# Patient Record
Sex: FEMALE | Race: WHITE | Employment: UNEMPLOYED | RURAL
[De-identification: names, ages, dates, MRNs, and addresses within clinical notes are randomized per-mention and may not be internally consistent; named-entity substitution may affect disease eponyms.]

---

## 2021-10-12 PROCEDURE — 80053 COMPREHEN METABOLIC PANEL: CPT

## 2021-10-12 PROCEDURE — 85025 COMPLETE CBC W/AUTO DIFF WBC: CPT

## 2021-10-12 PROCEDURE — 85379 FIBRIN DEGRADATION QUANT: CPT

## 2021-10-12 PROCEDURE — 84484 ASSAY OF TROPONIN QUANT: CPT

## 2021-10-12 PROCEDURE — 96375 TX/PRO/DX INJ NEW DRUG ADDON: CPT

## 2021-10-12 PROCEDURE — 99285 EMERGENCY DEPT VISIT HI MDM: CPT

## 2021-10-12 PROCEDURE — 96374 THER/PROPH/DIAG INJ IV PUSH: CPT

## 2021-10-12 PROCEDURE — 36415 COLL VENOUS BLD VENIPUNCTURE: CPT

## 2021-10-12 PROCEDURE — 83690 ASSAY OF LIPASE: CPT

## 2021-10-13 ENCOUNTER — APPOINTMENT (OUTPATIENT)
Dept: GENERAL RADIOLOGY | Age: 46
End: 2021-10-13
Attending: EMERGENCY MEDICINE
Payer: MEDICAID

## 2021-10-13 ENCOUNTER — HOSPITAL ENCOUNTER (EMERGENCY)
Age: 46
Discharge: HOME | End: 2021-10-13
Attending: EMERGENCY MEDICINE | Admitting: EMERGENCY MEDICINE
Payer: MEDICAID

## 2021-10-13 VITALS
OXYGEN SATURATION: 96 % | RESPIRATION RATE: 14 BRPM | HEART RATE: 82 BPM | SYSTOLIC BLOOD PRESSURE: 121 MMHG | TEMPERATURE: 98.1 F | DIASTOLIC BLOOD PRESSURE: 59 MMHG

## 2021-10-13 DIAGNOSIS — R07.9 ACUTE CHEST PAIN: Primary | ICD-10-CM

## 2021-10-13 DIAGNOSIS — M79.2 RADICULAR PAIN IN LEFT ARM: ICD-10-CM

## 2021-10-13 LAB
ALBUMIN SERPL-MCNC: 3.9 G/DL (ref 3.5–5)
ALBUMIN/GLOB SERPL: 1.2 {RATIO} (ref 1.1–2.2)
ALP SERPL-CCNC: 92 U/L (ref 45–117)
ALT SERPL-CCNC: 24 U/L (ref 12–78)
ANION GAP SERPL CALC-SCNC: 11 MMOL/L (ref 5–15)
AST SERPL-CCNC: 25 U/L (ref 15–37)
ATRIAL RATE: 93 BPM
ATRIAL RATE: 98 BPM
BASOPHILS # BLD: 0.1 K/UL (ref 0–0.1)
BASOPHILS NFR BLD: 1 % (ref 0–1)
BILIRUB SERPL-MCNC: 0.5 MG/DL (ref 0.2–1)
BUN SERPL-MCNC: 14 MG/DL (ref 6–20)
BUN/CREAT SERPL: 15 (ref 12–20)
CALCIUM SERPL-MCNC: 8.9 MG/DL (ref 8.5–10.1)
CALCULATED P AXIS, ECG09: 56 DEGREES
CALCULATED P AXIS, ECG09: 64 DEGREES
CALCULATED R AXIS, ECG10: 14 DEGREES
CALCULATED R AXIS, ECG10: 6 DEGREES
CALCULATED T AXIS, ECG11: -12 DEGREES
CALCULATED T AXIS, ECG11: -14 DEGREES
CHLORIDE SERPL-SCNC: 102 MMOL/L (ref 97–108)
CO2 SERPL-SCNC: 27 MMOL/L (ref 21–32)
CREAT SERPL-MCNC: 0.92 MG/DL (ref 0.55–1.02)
D DIMER PPP FEU-MCNC: 0.19 MG/L FEU (ref 0–0.65)
DIAGNOSIS, 93000: NORMAL
DIAGNOSIS, 93000: NORMAL
DIFFERENTIAL METHOD BLD: ABNORMAL
EOSINOPHIL # BLD: 0.1 K/UL (ref 0–0.4)
EOSINOPHIL NFR BLD: 1 % (ref 0–7)
ERYTHROCYTE [DISTWIDTH] IN BLOOD BY AUTOMATED COUNT: 13 % (ref 11.5–14.5)
GLOBULIN SER CALC-MCNC: 3.3 G/DL (ref 2–4)
GLUCOSE SERPL-MCNC: 121 MG/DL (ref 65–100)
HCT VFR BLD AUTO: 42.5 % (ref 35–47)
HGB BLD-MCNC: 14.6 G/DL (ref 11.5–16)
IMM GRANULOCYTES # BLD AUTO: 0 K/UL (ref 0–0.04)
IMM GRANULOCYTES NFR BLD AUTO: 0 % (ref 0–0.5)
LIPASE SERPL-CCNC: 50 U/L (ref 73–393)
LYMPHOCYTES # BLD: 2.7 K/UL (ref 0.8–3.5)
LYMPHOCYTES NFR BLD: 21 % (ref 12–49)
MCH RBC QN AUTO: 31.7 PG (ref 26–34)
MCHC RBC AUTO-ENTMCNC: 34.4 G/DL (ref 30–36.5)
MCV RBC AUTO: 92.2 FL (ref 80–99)
MONOCYTES # BLD: 0.9 K/UL (ref 0–1)
MONOCYTES NFR BLD: 7 % (ref 5–13)
NEUTS SEG # BLD: 9.1 K/UL (ref 1.8–8)
NEUTS SEG NFR BLD: 70 % (ref 32–75)
NRBC # BLD: 0 K/UL (ref 0–0.01)
NRBC BLD-RTO: 0 PER 100 WBC
P-R INTERVAL, ECG05: 118 MS
P-R INTERVAL, ECG05: 120 MS
PLATELET # BLD AUTO: 330 K/UL (ref 150–400)
PMV BLD AUTO: 9.9 FL (ref 8.9–12.9)
POTASSIUM SERPL-SCNC: 4.2 MMOL/L (ref 3.5–5.1)
PROT SERPL-MCNC: 7.2 G/DL (ref 6.4–8.2)
Q-T INTERVAL, ECG07: 362 MS
Q-T INTERVAL, ECG07: 374 MS
QRS DURATION, ECG06: 80 MS
QRS DURATION, ECG06: 94 MS
QTC CALCULATION (BEZET), ECG08: 462 MS
QTC CALCULATION (BEZET), ECG08: 465 MS
RBC # BLD AUTO: 4.61 M/UL (ref 3.8–5.2)
SODIUM SERPL-SCNC: 140 MMOL/L (ref 136–145)
TROPONIN-HIGH SENSITIVITY: 6 NG/L (ref 0–51)
TROPONIN-HIGH SENSITIVITY: 6 NG/L (ref 0–51)
VENTRICULAR RATE, ECG03: 93 BPM
VENTRICULAR RATE, ECG03: 98 BPM
WBC # BLD AUTO: 12.8 K/UL (ref 3.6–11)

## 2021-10-13 PROCEDURE — 74011000250 HC RX REV CODE- 250: Performed by: EMERGENCY MEDICINE

## 2021-10-13 PROCEDURE — 93005 ELECTROCARDIOGRAM TRACING: CPT

## 2021-10-13 PROCEDURE — 74011250637 HC RX REV CODE- 250/637: Performed by: EMERGENCY MEDICINE

## 2021-10-13 PROCEDURE — 71045 X-RAY EXAM CHEST 1 VIEW: CPT

## 2021-10-13 PROCEDURE — 74011250636 HC RX REV CODE- 250/636: Performed by: EMERGENCY MEDICINE

## 2021-10-13 RX ORDER — ONDANSETRON 2 MG/ML
4 INJECTION INTRAMUSCULAR; INTRAVENOUS ONCE
Status: COMPLETED | OUTPATIENT
Start: 2021-10-13 | End: 2021-10-13

## 2021-10-13 RX ORDER — KETOROLAC TROMETHAMINE 30 MG/ML
15 INJECTION, SOLUTION INTRAMUSCULAR; INTRAVENOUS
Status: COMPLETED | OUTPATIENT
Start: 2021-10-13 | End: 2021-10-13

## 2021-10-13 RX ORDER — MORPHINE SULFATE 2 MG/ML
2 INJECTION, SOLUTION INTRAMUSCULAR; INTRAVENOUS
Status: COMPLETED | OUTPATIENT
Start: 2021-10-13 | End: 2021-10-13

## 2021-10-13 RX ORDER — SODIUM CHLORIDE 0.9 % (FLUSH) 0.9 %
5-10 SYRINGE (ML) INJECTION ONCE
Status: DISCONTINUED | OUTPATIENT
Start: 2021-10-13 | End: 2021-10-13 | Stop reason: HOSPADM

## 2021-10-13 RX ORDER — NITROGLYCERIN 0.4 MG/1
0.4 TABLET SUBLINGUAL
Qty: 1 EACH | Refills: 0 | Status: SHIPPED | OUTPATIENT
Start: 2021-10-13 | End: 2022-10-08

## 2021-10-13 RX ADMIN — ONDANSETRON 4 MG: 2 INJECTION INTRAMUSCULAR; INTRAVENOUS at 00:26

## 2021-10-13 RX ADMIN — KETOROLAC TROMETHAMINE 15 MG: 30 INJECTION, SOLUTION INTRAMUSCULAR at 00:31

## 2021-10-13 RX ADMIN — MORPHINE SULFATE 2 MG: 2 INJECTION, SOLUTION INTRAMUSCULAR; INTRAVENOUS at 01:29

## 2021-10-13 RX ADMIN — LIDOCAINE HYDROCHLORIDE 40 ML: 20 SOLUTION ORAL; TOPICAL at 01:25

## 2021-10-13 RX ADMIN — NITROGLYCERIN 0.5 INCH: 20 OINTMENT TOPICAL at 01:27

## 2021-10-13 NOTE — ED PROVIDER NOTES
EMERGENCY DEPARTMENT HISTORY AND PHYSICAL EXAM      Date: 10/13/2021  Patient Name: Damaris Ham    History of Presenting Illness     Chief Complaint   Patient presents with    Chest Pain       History Provided By: Patient    HPI:   The history is provided by the patient. Chest Pain (Angina)   This is a new problem. The current episode started 3 to 5 hours ago. The problem has not changed since onset. The problem occurs constantly. The pain is associated with lifting, movement, lifting arms, normal activity and stress. The pain is present in the left side. The quality of the pain is described as pleuritic, tightness and sharp. The pain radiates to the left arm. Associated symptoms include cough, nausea, shortness of breath, vomiting and weakness. Pertinent negatives include no abdominal pain, no back pain, no diaphoresis, no dizziness, no exertional chest pressure, no fever, no headaches, no hemoptysis, no leg pain, no lower extremity edema, no malaise/fatigue, no near-syncope, no numbness, no orthopnea, no palpitations and no sputum production. She has tried nothing for the symptoms. The treatment provided no relief. Risk factors include hypertension. Her past medical history is significant for HTN. Her past medical history does not include DM. Damaris Ham, 48 y.o. female  presents to the ED with cc of left shoulder left neck left chest pain that started approximately 9 PM tonight. Patient reports history of coronary disease, she reports she has 5 stents. She has a history of high blood pressure high cholesterol. She reports she was sitting watching TV when this started. She reports she had pain in the side the neck that radiated down the left arm. RePorts her blood pressure was elevated she took extra doses of her lisinopril. She does report cough and shortness of breath. She reports she had an onset of generalized weakness prior to the symptoms starting.   She denies any exertional chest pain shortness of breath over the last several days but she has had intermittent chest pain at rest.    She denies any history of diabetes she is a former smoker. She took no medications. She received morphine aspirin nitroglycerin Zofran by EMS. She denies any recent trauma injuries or falls. She denies any abdominal pain diarrhea fevers chills upper respiratory symptoms. She denies any exposures to sick contacts COVID-19, she is not vaccinated. There are no other complaints, changes, or physical findings at this time. PCP: None    No current facility-administered medications on file prior to encounter. No current outpatient medications on file prior to encounter. Past History     Past Medical History:  No past medical history on file. Past Surgical History:  No past surgical history on file. Family History:  No family history on file. Social History:  Social History     Tobacco Use    Smoking status: Not on file   Substance Use Topics    Alcohol use: Not on file    Drug use: Not on file       Allergies:  Not on File      Review of Systems   Review of Systems   Constitutional: Negative. Negative for chills, diaphoresis, fever and malaise/fatigue. HENT: Negative. Negative for congestion and sore throat. Eyes: Negative. Negative for discharge and redness. Respiratory: Positive for cough and shortness of breath. Negative for hemoptysis and sputum production. Cardiovascular: Positive for chest pain. Negative for palpitations, orthopnea and near-syncope. Gastrointestinal: Positive for nausea and vomiting. Negative for abdominal pain and diarrhea. Endocrine: Negative. Negative for polydipsia and polyuria. Genitourinary: Negative. Negative for dysuria, flank pain and frequency. Musculoskeletal: Negative. Negative for arthralgias and back pain. Skin: Negative. Negative for rash and wound. Allergic/Immunologic: Negative. Neurological: Positive for weakness. Negative for dizziness, numbness and headaches. Hematological: Negative. Negative for adenopathy. Does not bruise/bleed easily. Psychiatric/Behavioral: Negative. Negative for confusion. The patient is not nervous/anxious. All other systems reviewed and are negative. Physical Exam   Physical Exam  Vitals and nursing note reviewed. Constitutional:       General: She is in acute distress. Appearance: Normal appearance. She is well-developed. She is not ill-appearing, toxic-appearing or diaphoretic. Comments: Patient lying in bed appears uncomfortable. HENT:      Head: Normocephalic and atraumatic. Nose: Nose normal.      Mouth/Throat:      Mouth: Mucous membranes are moist.      Pharynx: Oropharynx is clear. Eyes:      Extraocular Movements: Extraocular movements intact. Conjunctiva/sclera: Conjunctivae normal.      Pupils: Pupils are equal, round, and reactive to light. Neck:     Cardiovascular:      Rate and Rhythm: Normal rate and regular rhythm. Pulses: Normal pulses. Heart sounds: Normal heart sounds. Pulmonary:      Effort: Pulmonary effort is normal. No respiratory distress. Breath sounds: Normal breath sounds. Chest:      Chest wall: Tenderness present. Abdominal:      General: There is no distension. Palpations: Abdomen is soft. There is no mass. Tenderness: There is no abdominal tenderness. There is no guarding or rebound. Hernia: No hernia is present. Musculoskeletal:         General: No swelling. Normal range of motion. Left shoulder: Tenderness present. No swelling or bony tenderness. Normal strength. Normal pulse. Arms:       Cervical back: Normal range of motion and neck supple. Tenderness present. No rigidity. Muscular tenderness present. Right lower leg: No edema. Skin:     General: Skin is warm and dry. Capillary Refill: Capillary refill takes less than 2 seconds. Findings: No erythema or rash. Neurological:      General: No focal deficit present. Mental Status: She is alert and oriented to person, place, and time. Mental status is at baseline. Cranial Nerves: No cranial nerve deficit. Sensory: No sensory deficit. Motor: No weakness. Psychiatric:         Mood and Affect: Mood normal.         Behavior: Behavior normal.         Thought Content: Thought content normal.         Judgment: Judgment normal.         Diagnostic Study Results     Labs -     Recent Results (from the past 12 hour(s))   CBC WITH AUTOMATED DIFF    Collection Time: 10/12/21 11:58 PM   Result Value Ref Range    WBC 12.8 (H) 3.6 - 11.0 K/uL    RBC 4.61 3.80 - 5.20 M/uL    HGB 14.6 11.5 - 16.0 g/dL    HCT 42.5 35.0 - 47.0 %    MCV 92.2 80.0 - 99.0 FL    MCH 31.7 26.0 - 34.0 PG    MCHC 34.4 30.0 - 36.5 g/dL    RDW 13.0 11.5 - 14.5 %    PLATELET 952 140 - 731 K/uL    MPV 9.9 8.9 - 12.9 FL    NRBC 0.0 0  WBC    ABSOLUTE NRBC 0.00 0.00 - 0.01 K/uL    NEUTROPHILS 70 32 - 75 %    LYMPHOCYTES 21 12 - 49 %    MONOCYTES 7 5 - 13 %    EOSINOPHILS 1 0 - 7 %    BASOPHILS 1 0 - 1 %    IMMATURE GRANULOCYTES 0 0.0 - 0.5 %    ABS. NEUTROPHILS 9.1 (H) 1.8 - 8.0 K/UL    ABS. LYMPHOCYTES 2.7 0.8 - 3.5 K/UL    ABS. MONOCYTES 0.9 0.0 - 1.0 K/UL    ABS. EOSINOPHILS 0.1 0.0 - 0.4 K/UL    ABS. BASOPHILS 0.1 0.0 - 0.1 K/UL    ABS. IMM.  GRANS. 0.0 0.00 - 0.04 K/UL    DF AUTOMATED     METABOLIC PANEL, COMPREHENSIVE    Collection Time: 10/12/21 11:58 PM   Result Value Ref Range    Sodium 140 136 - 145 mmol/L    Potassium 4.2 3.5 - 5.1 mmol/L    Chloride 102 97 - 108 mmol/L    CO2 27 21 - 32 mmol/L    Anion gap 11 5 - 15 mmol/L    Glucose 121 (H) 65 - 100 mg/dL    BUN 14 6 - 20 MG/DL    Creatinine 0.92 0.55 - 1.02 MG/DL    BUN/Creatinine ratio 15 12 - 20      GFR est AA >60 >60 ml/min/1.73m2    GFR est non-AA >60 >60 ml/min/1.73m2    Calcium 8.9 8.5 - 10.1 MG/DL    Bilirubin, total 0.5 0.2 - 1.0 MG/DL    ALT (SGPT) 24 12 - 78 U/L    AST (SGOT) 25 15 - 37 U/L    Alk. phosphatase 92 45 - 117 U/L    Protein, total 7.2 6.4 - 8.2 g/dL    Albumin 3.9 3.5 - 5.0 g/dL    Globulin 3.3 2.0 - 4.0 g/dL    A-G Ratio 1.2 1.1 - 2.2     LIPASE    Collection Time: 10/12/21 11:58 PM   Result Value Ref Range    Lipase 50 (L) 73 - 393 U/L   D DIMER    Collection Time: 10/12/21 11:58 PM   Result Value Ref Range    D-dimer 0.19 0.00 - 0.65 mg/L FEU   TROPONIN-HIGH SENSITIVITY    Collection Time: 10/12/21 11:58 PM   Result Value Ref Range    Troponin-High Sensitivity 6 0 - 51 ng/L   EKG, 12 LEAD, INITIAL    Collection Time: 10/13/21 12:16 AM   Result Value Ref Range    Ventricular Rate 98 BPM    Atrial Rate 98 BPM    P-R Interval 120 ms    QRS Duration 80 ms    Q-T Interval 362 ms    QTC Calculation (Bezet) 462 ms    Calculated P Axis 64 degrees    Calculated R Axis 14 degrees    Calculated T Axis -12 degrees    Diagnosis       Normal sinus rhythm  Nonspecific ST and T wave abnormality  Abnormal ECG  No previous ECGs available     EKG, 12 LEAD, SUBSEQUENT    Collection Time: 10/13/21  2:18 AM   Result Value Ref Range    Ventricular Rate 93 BPM    Atrial Rate 93 BPM    P-R Interval 118 ms    QRS Duration 94 ms    Q-T Interval 374 ms    QTC Calculation (Bezet) 465 ms    Calculated P Axis 56 degrees    Calculated R Axis 6 degrees    Calculated T Axis -14 degrees    Diagnosis       Normal sinus rhythm  Minimal voltage criteria for LVH, may be normal variant  Nonspecific ST abnormality  Abnormal ECG  When compared with ECG of 13-OCT-2021 00:16,  MANUAL COMPARISON REQUIRED, DATA IS UNCONFIRMED     TROPONIN-HIGH SENSITIVITY    Collection Time: 10/13/21  2:25 AM   Result Value Ref Range    Troponin-High Sensitivity 6 0 - 51 ng/L       Radiologic Studies -   XR CHEST PORT   Final Result   No acute process identified.                CT Results  (Last 48 hours)    None        CXR Results  (Last 48 hours)               10/13/21 0038  XR CHEST PORT Final result    Impression:  No acute process identified. Narrative:  Clinical history: left CP   INDICATION:   left CP   COMPARISON: None       FINDINGS:   AP portable upright view of the chest demonstrates a normal cardiopericardial   silhouette. There is no pleural effusion. .There is no focal consolidation. .There   is no pneumothorax. . Patient is on a cardiac monitor. Medical Decision Making   I am the first provider for this patient. I reviewed the vital signs, available nursing notes, past medical history, past surgical history, family history and social history. Vital Signs-Reviewed the patient's vital signs. Patient Vitals for the past 12 hrs:   Temp Pulse Resp BP SpO2   10/13/21 0300  82 14 (!) 121/59    10/13/21 0245   17 101/65    10/13/21 0230  90 21 108/63    10/13/21 0215  99 16 122/84    10/13/21 0200  91 22 117/77    10/13/21 0145  92 23 127/79    10/13/21 0130  90 16 (!) 147/82    10/13/21 0115  86 13 134/87 96 %   10/13/21 0100   13 (!) 153/94    10/13/21 0045  91 12     10/13/21 0030  (!) 101 17 (!) 121/91    10/13/21 0007 98.1 °F (36.7 °C) (!) 106 20  99 %           EKG interpretation: (Preliminary)  Rhythm: normal sinus rhythm;  regular . Rate (approx.): 98; Blocks: none; Ectopy: noneAxis: normal; P wave: normal; QRS interval: normal ; ST/T wave: non-specific changes; in  Lead: ; Other findings: . EKG interpretation: (Preliminary)  Rhythm: normal sinus rhythm; and regular . Rate (approx.): 93; Blocks: none; Ectopy: none; Axis: normal; P wave: normal; QRS interval: normal ; ST/T wave: non-specific changes; in  Lead: ; Other findings: No change compared with prior. This EKG was interpreted by Nora Crockett MD ,ED Provider.       Records Reviewed: Nursing Notes and Old Medical Records    Provider Notes (Medical Decision Making):   Patient presents with chest pain reports history of coronary disease, will check laboratory studies medicate for symptoms and reassess. ED Course:   Initial assessment performed. The patients presenting problems have been discussed, and they are in agreement with the care plan formulated and outlined with them. I have encouraged them to ask questions as they arise throughout their visit. ED Course as of Oct 13 0306   Wed Oct 13, 2021   0118 Patient reports her chest pain has almost completely resolved, reviewed lab results with patient and plan for transfer admission. Patient wants to be discharged, will agree to do a second troponin. [MF]   4455 Reviewed repeat troponin with patient, recommend admission to the hospital for cardiology evaluation. Patient does not want to be admitted she reports her chest pain is almost completely resolved. She wants to be discharged to contact her cardiologist later today and to be set up for a stress test as an outpatient. She will return if there is any change or worsening of her symptoms. Risk and benefits explained to patient including death recurrent chest pain heart attack and increased morbidity mortality. Patient verbalizes understanding with her significant other and wants to be discharged. She will continue her aspirin at home. [MF]      ED Course User Index  [MF] Darryle Blanks, MD         Medications Given in the ED:    Medications   sodium chloride (NS) flush 5-10 mL (has no administration in time range)   [facility] injection 4 mg (4 mg IntraVENous Given 10/13/21 0026)   ketorolac (TORADOL) injection 15 mg (15 mg IntraVENous Given 10/13/21 0031)   nitroglycerin (NITROBID) 2 % ointment 0.5 Inch (0.5 Inches Topical Given 10/13/21 0127)   morphine injection 2 mg (2 mg IntraVENous Given 10/13/21 0129)   mylanta/viscous lidocaine (GI COCKTAIL) (40 mL Oral Given 10/13/21 0125)           3:06 AM    Patient presents complaining of chest pain his history of coronary disease with 5 stents.   EKG revealed nonspecific changes, troponin x2 is negative, white blood cell count was elevated. Patient has no fever no signs of sepsis, she declined COVID-19 test.  Chest x-ray was clear. Patient improved with medications here. Patient was recommended to be transferred admitted for cardiology evaluation patient declined risk and benefits explained, she wants to follow-up with her cardiologist as an outpatient later today she is instructed to return for change or worsening of symptoms. Patient does have elevated heart score recommend admission however patient again wants to do outpatient management and will return for any change in symptoms. Pt has been re-examined and states that they are feeling better and have no new complaints. Laboratory tests, medications, x-rays, diagnosis, follow up plan and return instructions have been reviewed and discussed with the patient and/or family. Pt and/or family were instructed on symptoms that may arise after discharge requiring re-evaluation by a physician. Pt and/or family have had the opportunity to ask questions about their care. Patient and/or family verbalized understanding and agreement with care plan, follow up and return instructions. Patient and/or family agree to return in 50 hours if their symptoms are not improving or immediately if they have any change in their condition. I have also put together some discharge instructions for patient that include: 1) educational information regarding their diagnosis, 2) how to care for their diagnosis at home, as well a 3) list of reasons why they would want to return to the ED prior to their follow-up appointment, should their condition change. Jose Cohen MD      Procedures        Disposition:    Discharged      DISCHARGE PLAN:  1. There are no discharge medications for this patient.     2.   Follow-up Information     Follow up With Specialties Details Why Contact Info    Carson Msos MD Cardiology Call today For follow up [place] [address]  [phone]          9. Return to ED if worse     Diagnosis     Clinical Impression:   1. Acute chest pain    2. Radicular pain in left arm        Attestations: Karlee Kirkpatrick MD    Please note that this dictation was completed with [employer], the computer voice recognition software. Quite often unanticipated grammatical, syntax, homophones, and other interpretive errors are inadvertently transcribed by the computer software. Please disregard these errors. Please excuse any errors that have escaped final proofreading. Thank you.

## 2022-02-15 ENCOUNTER — TRANSCRIBE ORDER (OUTPATIENT)
Dept: SCHEDULING | Age: 47
End: 2022-02-15

## 2022-02-15 DIAGNOSIS — Z12.31 OTHER SCREENING MAMMOGRAM: Primary | ICD-10-CM

## 2022-04-04 ENCOUNTER — APPOINTMENT (OUTPATIENT)
Dept: CT IMAGING | Age: 47
End: 2022-04-04
Attending: FAMILY MEDICINE
Payer: COMMERCIAL

## 2022-04-04 ENCOUNTER — HOSPITAL ENCOUNTER (EMERGENCY)
Age: 47
Discharge: HOME | End: 2022-04-04
Attending: FAMILY MEDICINE
Payer: COMMERCIAL

## 2022-04-04 VITALS
RESPIRATION RATE: 22 BRPM | WEIGHT: 182 LBS | HEART RATE: 123 BPM | DIASTOLIC BLOOD PRESSURE: 123 MMHG | OXYGEN SATURATION: 95 % | SYSTOLIC BLOOD PRESSURE: 187 MMHG

## 2022-04-04 DIAGNOSIS — S01.511A LIP LACERATION, INITIAL ENCOUNTER: Primary | ICD-10-CM

## 2022-04-04 PROCEDURE — 77030018842 HC SOL IRR SOD CL 9% BAXT -A

## 2022-04-04 PROCEDURE — 77030002916 HC SUT ETHLN J&J -A

## 2022-04-04 PROCEDURE — 77030018831 HC SOL IRR H20 BAXT -A

## 2022-04-04 PROCEDURE — 70450 CT HEAD/BRAIN W/O DYE: CPT

## 2022-04-04 PROCEDURE — 74011250637 HC RX REV CODE- 250/637: Performed by: FAMILY MEDICINE

## 2022-04-04 PROCEDURE — 77030031139 HC SUT VCRL2 J&J -A

## 2022-04-04 PROCEDURE — 72125 CT NECK SPINE W/O DYE: CPT

## 2022-04-04 PROCEDURE — 75810000294 HC INTERM/LAYERED WND RPR

## 2022-04-04 PROCEDURE — 99284 EMERGENCY DEPT VISIT MOD MDM: CPT

## 2022-04-04 PROCEDURE — 77030002888 HC SUT CHRMC J&J -A

## 2022-04-04 RX ORDER — HYDROCODONE BITARTRATE AND ACETAMINOPHEN 5; 325 MG/1; MG/1
1 TABLET ORAL
Qty: 12 TABLET | Refills: 0 | Status: SHIPPED | OUTPATIENT
Start: 2022-04-04 | End: 2022-04-07

## 2022-04-04 RX ORDER — AMOXICILLIN AND CLAVULANATE POTASSIUM 875; 125 MG/1; MG/1
1 TABLET, FILM COATED ORAL 2 TIMES DAILY
Qty: 14 TABLET | Refills: 0 | Status: SHIPPED | OUTPATIENT
Start: 2022-04-04 | End: 2022-09-21

## 2022-04-04 RX ORDER — LIDOCAINE HYDROCHLORIDE AND EPINEPHRINE 10; 10 MG/ML; UG/ML
1.5 INJECTION, SOLUTION INFILTRATION; PERINEURAL ONCE
Status: DISCONTINUED | OUTPATIENT
Start: 2022-04-04 | End: 2022-04-04 | Stop reason: HOSPADM

## 2022-04-04 RX ORDER — HYDROCODONE BITARTRATE AND ACETAMINOPHEN 5; 325 MG/1; MG/1
1 TABLET ORAL
Status: COMPLETED | OUTPATIENT
Start: 2022-04-04 | End: 2022-04-04

## 2022-04-04 RX ORDER — AMOXICILLIN AND CLAVULANATE POTASSIUM 875; 125 MG/1; MG/1
1 TABLET, FILM COATED ORAL
Status: COMPLETED | OUTPATIENT
Start: 2022-04-04 | End: 2022-04-04

## 2022-04-04 RX ORDER — ONDANSETRON 4 MG/1
4 TABLET, ORALLY DISINTEGRATING ORAL
Status: DISCONTINUED | OUTPATIENT
Start: 2022-04-04 | End: 2022-04-04 | Stop reason: HOSPADM

## 2022-04-04 RX ADMIN — AMOXICILLIN AND CLAVULANATE POTASSIUM 1 TABLET: 875; 125 TABLET, FILM COATED ORAL at 03:50

## 2022-04-04 RX ADMIN — HYDROCODONE BITARTRATE AND ACETAMINOPHEN 1 TABLET: 5; 325 TABLET ORAL at 03:50

## 2022-04-04 NOTE — DISCHARGE INSTRUCTIONS
--Augmentin 875 mg twice daily for the next 7 days. --Ibuprofen 600 mg every 6 hours as needed for pain. Take with food or milk. --Norco 1 tab every 6 hours as needed for severe pain. Will cause drowsiness and nausea. --Follow up with Dr. Terri Maldonado or Anjel Harris this week for a recheck.

## 2022-04-04 NOTE — ED TRIAGE NOTES
Pt experienced mechanical fall at home pta. Tripped in gravel driveway and landed on her face. Tear to left upper lip, bleeding controlled at this time. Pt reports no LOC.  States utd on t-dap

## 2022-04-04 NOTE — ED PROVIDER NOTES
EMERGENCY DEPARTMENT HISTORY AND PHYSICAL EXAM          Date: 4/4/2022  Patient Name: Sriram Liao    History of Presenting Illness     Chief Complaint   Patient presents with    Fall       History Provided By: Patient and Patient's     HPI: Sriram Liao is a 48 y.o. female, pmhx CAD, s/p 5 stents, Htn, was brought to the ED by her boyfriend because of a facial injury that occurred when she fell about an hour ago. She and her partner had taken a large amount of [place] earlier in the evening and they had gone to bed. She got up to get a cigarette, which she had put in her truck. As she was walking to the truck, she tripped and fell on her face. She denies that she had any LOC, and she has no pain except in her upper lip, where she has a significant laceration. She reports that she has a laceration in the underside of her tongue. No injuries to her hands or arms. PCP: None    Allergies: NKDA  Social Hx: Occas tobacco, -vaping, -EtOH; Lives in [facility] partner    There are no other complaints, changes, or physical findings at this time. Current Outpatient Medications   Medication Sig Dispense Refill    amoxicillin-clavulanate (Augmentin) 875-125 mg per tablet Take 1 Tablet by mouth two (2) times a day. 14 Tablet 0    HYDROcodone-acetaminophen (Norco) 5-325 mg per tablet Take 1 Tablet by mouth every six (6) hours as needed for Pain for up to 3 days. Max Daily Amount: 4 Tablets. 12 Tablet 0    nitroglycerin (NITROSTAT) 0.4 mg SL tablet Take 1 Tablet by mouth every five (5) minutes as needed for Chest Pain (x3) for up to 360 days. 1 Each 0       Past History     Past Medical History:  History reviewed. No pertinent past medical history. Past Surgical History:  No past surgical history on file. Family History:  History reviewed. No pertinent family history.     Social History:  Social History     Tobacco Use    Smoking status: Not on file    Smokeless tobacco: Not on file Substance Use Topics    Alcohol use: Not on file    Drug use: Not on file       Allergies:  No Known Allergies      Review of Systems   Review of Systems   HENT: Positive for dental problem and facial swelling. Eyes: Negative for photophobia and visual disturbance. Cardiovascular: Negative for chest pain. Gastrointestinal: Negative for nausea. Musculoskeletal: Positive for neck pain. Skin: Positive for wound. Neurological: Positive for light-headedness. Negative for headaches. Physical Exam   Physical Exam  Constitutional:       General: She is in acute distress. Comments: Appears somewhat intoxicated but appropriate and apologetic. HENT:      Head: Normocephalic. Abrasion present. Jaw: There is normal jaw occlusion. No tenderness. Comments: --Abrasion over anterior aspect of nose. Nose: Signs of injury and nasal tenderness present. No nasal deformity. Right Nostril: No epistaxis. Left Nostril: No epistaxis. Comments: Abrasion, swelling and tenderness along anterior aspect, but no evidence of epistaxis or deformity. Abrasion fairly superficial.     Mouth/Throat:      Mouth: Mucous membranes are moist. Injury and lacerations present. Comments: --Significant laceration to upper lip, with a large gap in the mid portion, between two flaps of lip, just to vermilion border. Flaps are retracted, with extensive gravel dust embedded. --Teeth in tact. Possibly superficial chipping on underside of anterior incisors. Eyes:      Extraocular Movements: Extraocular movements intact. Pupils: Pupils are equal, round, and reactive to light. Cardiovascular:      Rate and Rhythm: Normal rate. Pulmonary:      Effort: Pulmonary effort is normal.   Musculoskeletal:         General: No swelling or tenderness. Normal range of motion. Skin:     General: Skin is warm and dry. Neurological:      Mental Status: She is alert.          Diagnostic Study Results Labs -   No results found for this or any previous visit (from the past 12 hour(s)). Radiologic Studies -   CT HEAD WO CONT   Final Result   1. No evidence of acute intracranial abnormality by this modality. CT SPINE CERV WO CONT   Final Result   1. No visualized fracture or subluxation. CT Results  (Last 48 hours)               04/04/22 0328  CT HEAD WO CONT Final result    Impression:  1. No evidence of acute intracranial abnormality by this modality. Narrative:  EXAM:  CT HEAD WO CONT   INDICATION:   fall c alcohol. lip lac. Additional history:   COMPARISON: None. .   TECHNIQUE:    Unenhanced CT of the head was performed using 5 mm images. Coronal and sagittal   reformats were produced. Brain and bone windows were generated. CT dose reduction was achieved through use of a standardized protocol tailored   for this examination and automatic exposure control for dose modulation. Lilly Stoner FINDINGS:   The ventricles and sulci are normal in size, shape and configuration and   midline. There is no significant white matter disease. There is no intracranial   hemorrhage, extra-axial collection, mass, mass effect or midline shift. The   basilar cisterns are open. No acute infarct is identified. The bone windows demonstrate no abnormalities. The visualized portions of the   paranasal sinuses and mastoid air cells are clear. Lilly Stoner 04/04/22 0328  CT SPINE CERV WO CONT Final result    Impression:  1. No visualized fracture or subluxation. Narrative:  INDICATION:  None provided. Additional history: Fall   COMPARISON: None. .   TECHNIQUE:      Noncontrast axial CT imaging of the cervical spine was performed. Coronal and   sagittal reconstructions were obtained. CT dose reduction was achieved through use of a standardized protocol tailored   for this examination and automatic exposure control for dose modulation. Lilly Snider    FINDINGS:   Disc osteophyte complex formation from C5-C7. Reversal of the normal cervical   lordosis. There is no visualized fracture or subluxation. Incidental imaging of the paraspinal soft tissues is unremarkable. The   visualized lung apices are unremarkable. .           CXR Results  (Last 48 hours)    None            Medical Decision Making   I am the first provider for this patient. I reviewed the vital signs, available nursing notes, past medical history, past surgical history, family history and social history. Vital Signs-Reviewed the patient's vital signs. No data found. Pulse Oximetry Analysis - 95% on RA    Records Reviewed: Nursing Notes    Provider Notes (Medical Decision Making):   MDM: Pt with significant facial injury, so hypertension understandable. After CT scans, hydrocodone, ondansetron, BP still elevated. Will have her follow up with general surgery and speak with surgeon, who may want to send her to Plastic surgery for follow up. ED Course:   Initial assessment performed. The patients presenting problems have been discussed, and they are in agreement with the care plan formulated and outlined with them. I have encouraged them to ask questions as they arise throughout their visit. PROGRESS NOTE:  Wound Repair    Date/Time: 4/4/2022 2:17 AM  Performed by: attendingTime out: Immediately prior to the procedure a time out was called to verify the correct patient, procedure, equipment, staff and marking as appropriate. .  Location details: face  Wound length:2.6 - 7.5 cm  Anesthesia: local infiltration    Anesthesia:  Local Anesthetic: lidocaine 1% with epinephrine and lidocaine 1% without epinephrine  Anesthetic total: 5 mL  Sedatives: morphine  Foreign body present: gravel. Irrigation solution: saline  Irrigation method: 500 ml NS from bottle.   Debridement: none  Skin closure: 6-0 nylon  Subcutaneous closure: gut (Tongue, inferior aspect: 2 cm. 2 5-0 chromic interrupted sutures.)  Number of sutures: 18  Technique: simple  Approximation: loose  Lip approximation: vermillion border well aligned  Patient tolerance: patient tolerated the procedure well with no immediate complications  My total time at bedside, performing this procedure was 46-60 minutes. Discharge note:  Pt re-evaluated and noted to be feeling better, ready for discharge. Updated pt  on all final CT findings. Will follow up as instructed. All questions have been answered, pt voiced understanding and agreement with plan. Narcotics were prescribed, pt was advised not to drive or operate heavy machinery. Abx were prescribed, pt advised that diarrhea and rash are possible side effects of the medications. Specific return precautions provided as well as instructions to return to the ED should sx worsen at any time. Vital signs stable for discharge. Critical Care Time:   0      Diagnosis     Clinical Impression:   1. Lip laceration, initial encounter        PLAN:  1. Discharge Medication List as of 4/4/2022  3:49 AM      START taking these medications    Details   amoxicillin-clavulanate (Augmentin) 875-125 mg per tablet Take 1 Tablet by mouth two (2) times a day., Normal, Disp-14 Tablet, R-0      HYDROcodone-acetaminophen (Norco) 5-325 mg per tablet Take 1 Tablet by mouth every six (6) hours as needed for Pain for up to 3 days. Max Daily Amount: 4 Tablets., Normal, Disp-12 Tablet, R-0         CONTINUE these medications which have NOT CHANGED    Details   nitroglycerin (NITROSTAT) 0.4 mg SL tablet Take 1 Tablet by mouth every five (5) minutes as needed for Chest Pain (x3) for up to 360 days. , Print, Disp-1 Each, R-0           2.    Follow-up Information     Follow up With Specialties Details Why Contact Info    Shawnee Scheuermann, MD General Surgery In 2 days For wound re-check [facility] [address] [place] [address]  [phone]          Return to ED if worse     Disposition:  Home       Please note, this dictation was completed with Dragon, the computer voice recognition software. Quite often unanticipated grammatical, syntax, homophones, and other interpretive errors are inadvertently transcribed by the computer software. Please disregard these errors. Please excuse any errors that have escaped final proof reading.

## 2022-04-04 NOTE — Clinical Note
[address] [place] [address] EMERGENCY DEP  [address] [facility]   [place] [address]  [phone]    Work/School Note    Date: 4/4/2022    To Whom It May concern:    Michael Mcbride was seen and treated today in the emergency room by the following provider(s):  Attending Provider: Zoe Rodriguez MD.      Michael Mcbride is excused from work/school on 4/4/2022 through 4/6/2022. She is medically clear to return to work/school on 4/7/2022.          Sincerely,          Raegan Jiménez MD

## 2022-04-08 ENCOUNTER — OFFICE VISIT (OUTPATIENT)
Dept: SURGERY | Age: 47
End: 2022-04-08
Payer: COMMERCIAL

## 2022-04-08 VITALS
DIASTOLIC BLOOD PRESSURE: 80 MMHG | SYSTOLIC BLOOD PRESSURE: 160 MMHG | WEIGHT: 184 LBS | BODY MASS INDEX: 28.88 KG/M2 | HEART RATE: 77 BPM | HEIGHT: 67 IN

## 2022-04-08 DIAGNOSIS — Z48.02 VISIT FOR SUTURE REMOVAL: Primary | ICD-10-CM

## 2022-04-08 PROCEDURE — 99203 OFFICE O/P NEW LOW 30 MIN: CPT | Performed by: SURGERY

## 2022-04-08 RX ORDER — LISINOPRIL 20 MG/1
20 TABLET ORAL DAILY
COMMUNITY
End: 2022-09-21

## 2022-04-08 RX ORDER — METOPROLOL SUCCINATE 100 MG/1
25 TABLET, EXTENDED RELEASE ORAL DAILY
COMMUNITY

## 2022-04-08 NOTE — PROGRESS NOTES
Blood pressure was elevated this morning. Patient told me that she did not take her BP medications this AM, but she will take her medications once she get home.   I also to patient to check her BP for four days and if her BP remains elevated please see her PCP re: her elevated BP

## 2022-04-08 NOTE — PATIENT INSTRUCTIONS

## 2022-04-08 NOTE — PROGRESS NOTES
Denise Lebron is a 48 y.o. female who presents today with the following:  Chief Complaint   Patient presents with    New Patient     f/u stitches to face       HPI    59-year-old female who sustained a significant fall 5 days ago with a significant lip laceration and multiple abrasions on the nose and philtrum. She had a laceration repair by the ED physician and presents for initial visit in our office for possible suture removal.  She states that she was not aware that she was to be performing any wound care. She has been showering but not applying any antibiotic ointment or other treatments to her lacerations and has developed significant crusting. No past medical history on file. No past surgical history on file. Social History     Socioeconomic History    Marital status: LEGALLY      Spouse name: Not on file    Number of children: Not on file    Years of education: Not on file    Highest education level: Not on file   Occupational History    Not on file   Tobacco Use    Smoking status: Former Smoker    Smokeless tobacco: Never Used   Substance and Sexual Activity    Alcohol use: Not on file    Drug use: Not on file    Sexual activity: Not on file   Other Topics Concern    Not on file   Social History Narrative    Not on file     Social Determinants of Health     Financial Resource Strain:     Difficulty of Paying Living Expenses: Not on file   Food Insecurity:     Worried About [address] [employer] in the Last Year: Not on file    [place] of Food in the Last Year: Not on file   Transportation Needs:     Lack of Transportation (Medical): Not on file    Lack of Transportation (Non-Medical):  Not on file   Physical Activity:     Days of Exercise per Week: Not on file    Minutes of Exercise per Session: Not on file   Stress:     Feeling of Stress : Not on file   Social Connections:     Frequency of Communication with Friends and Family: Not on file    Frequency of Social Gatherings with Friends and Family: Not on file    Attends Mandaeism Services: Not on file    Active Member of Clubs or Organizations: Not on file    Attends Club or Organization Meetings: Not on file    Marital Status: Not on file   Intimate Partner Violence:     Fear of Current or Ex-Partner: Not on file    Emotionally Abused: Not on file    Physically Abused: Not on file    Sexually Abused: Not on file   Housing Stability:     Unable to Pay for Housing in the Last Year: Not on file    Number of [place] in the Last Year: Not on file    Unstable Housing in the Last Year: Not on file       No family history on file. No Known Allergies    Current Outpatient Medications   Medication Sig    lisinopriL (PRINIVIL, ZESTRIL) 20 mg tablet Take 20 mg by mouth daily.  metoprolol succinate (TOPROL-XL) 100 mg tablet Take 25 mg by mouth daily.  amoxicillin-clavulanate (Augmentin) 875-125 mg per tablet Take 1 Tablet by mouth two (2) times a day.  beclomethasone (Qvar) 40 mcg/actuation aero Take 1 Puff by inhalation two (2) times a day. (Patient not taking: Reported on 4/8/2022)    nitroglycerin (NITROSTAT) 0.4 mg SL tablet Take 1 Tablet by mouth every five (5) minutes as needed for Chest Pain (x3) for up to 360 days. No current facility-administered medications for this visit. The above histories, medications and allergies have been reviewed. ROS    Visit Vitals  BP (!) 160/80 Comment: manual BP left arm   Pulse 77   Ht 5' 7\" (1.702 m)   Wt 184 lb (83.5 kg)   BMI 28.82 kg/m²     Physical Exam  Constitutional:       Appearance: Normal appearance. HENT:      Mouth/Throat:        Comments: Fairly thick crusting along the nose and the philtrum as well as a lip. No signs of infection. Some sutures identified in the lip. Neurological:      Mental Status: She is alert. Psychiatric:      Comments: Tearful       Area was cleansed with a combination of saline and peroxide.   Crusting was fairly thick and unable to entirely be removed. Was able to remove significant portions of the crusting and 4 sutures were removed in the lip. See images. 1. Visit for suture removal  4 sutures were removed. Some of the crusting was removed. Antibiotic ointment was applied to the areas to help soft and the eschars. We will plan on bringing her back in approximately 3 days for further suture removal and evaluation. Follow-up and Dispositions    · Return in about 3 days (around 4/11/2022) for recheck.          Ivan Garcia MD

## 2022-04-11 ENCOUNTER — OFFICE VISIT (OUTPATIENT)
Dept: SURGERY | Age: 47
End: 2022-04-11
Payer: COMMERCIAL

## 2022-04-11 VITALS
DIASTOLIC BLOOD PRESSURE: 104 MMHG | WEIGHT: 182 LBS | SYSTOLIC BLOOD PRESSURE: 149 MMHG | BODY MASS INDEX: 28.56 KG/M2 | HEART RATE: 73 BPM | HEIGHT: 67 IN

## 2022-04-11 DIAGNOSIS — Z48.02 VISIT FOR SUTURE REMOVAL: Primary | ICD-10-CM

## 2022-04-11 PROCEDURE — 99213 OFFICE O/P EST LOW 20 MIN: CPT | Performed by: SURGERY

## 2022-04-11 NOTE — PROGRESS NOTES
Asked patient to wait in lobby until I get the next pt back to recheck her BP, pt left before I could check her BP.   Patient told me that she did not take her BP medications this AM

## 2022-04-11 NOTE — PROGRESS NOTES
French Villarreal is a 48 y.o. female who presents today with the following:  Chief Complaint   Patient presents with    Follow-up       HPI    Presents today for ongoing suture removal and wound check of her facial laceration that involved her upper lip. She has been doing well. She has been showering twice a day and applying antibiotic ointment. She is actually moved for the sutures herself at home. She feels much better. Social History     Socioeconomic History    Marital status: LEGALLY      Spouse name: Not on file    Number of children: Not on file    Years of education: Not on file    Highest education level: Not on file   Occupational History    Not on file   Tobacco Use    Smoking status: Former Smoker    Smokeless tobacco: Never Used   Substance and Sexual Activity    Alcohol use: Not on file    Drug use: Not on file    Sexual activity: Not on file   Other Topics Concern    Not on file   Social History Narrative    Not on file     Social Determinants of Health     Financial Resource Strain:     Difficulty of Paying Living Expenses: Not on file   Food Insecurity:     Worried About [address] [employer] in the Last Year: Not on file    [place] of Food in the Last Year: Not on file   Transportation Needs:     Lack of Transportation (Medical): Not on file    Lack of Transportation (Non-Medical):  Not on file   Physical Activity:     Days of Exercise per Week: Not on file    Minutes of Exercise per Session: Not on file   Stress:     Feeling of Stress : Not on file   Social Connections:     Frequency of Communication with Friends and Family: Not on file    Frequency of Social Gatherings with Friends and Family: Not on file    Attends [employer] Services: Not on file    Active Member of Clubs or Organizations: Not on file    Attends Club or Organization Meetings: Not on file    Marital Status: Not on file   Intimate Partner Violence:     Fear of Current or Ex-Partner: Not on file    Emotionally Abused: Not on file    Physically Abused: Not on file    Sexually Abused: Not on file   Housing Stability:     Unable to Pay for Housing in the Last Year: Not on file    Number of Places Lived in the Last Year: Not on file    Unstable Housing in the Last Year: Not on file       No family history on file. No Known Allergies    Current Outpatient Medications   Medication Sig    lisinopriL (PRINIVIL, ZESTRIL) 20 mg tablet Take 20 mg by mouth daily.  metoprolol succinate (TOPROL-XL) 100 mg tablet Take 25 mg by mouth daily.  beclomethasone (Qvar) 40 mcg/actuation aero Take 1 Puff by inhalation two (2) times a day. (Patient not taking: Reported on 4/8/2022)    amoxicillin-clavulanate (Augmentin) 875-125 mg per tablet Take 1 Tablet by mouth two (2) times a day. (Patient not taking: Reported on 4/11/2022)    nitroglycerin (NITROSTAT) 0.4 mg SL tablet Take 1 Tablet by mouth every five (5) minutes as needed for Chest Pain (x3) for up to 360 days. (Patient not taking: Reported on 4/11/2022)     No current facility-administered medications for this visit. The above histories, medications and allergies have been reviewed. ROS    Visit Vitals  BP (!) (P) 149/104 Comment: right arm manual   Pulse (P) 70   Ht 5' 7\" (1.702 m)   Wt 182 lb (82.6 kg)   BMI 28.51 kg/m²     Physical Exam  HENT:      Head:      Comments: Remaining sutures are removed from her lip. Antibiotic ointment is applied. Photographs are obtained. Approximately a 1-1/4 cm area of central necrosis is visible below the vermilion border. 1. Visit for suture removal  Remaining sutures are removed. There is a area of necrosis just to the left of the philtrum below the vermilion border. Antibiotic ointment is applied. I like to bring her back in approximately 4 to 5 days to see how this is healing. She may require referral to plastic surgery. Patient is aware.       Follow-up and Dispositions · Return in about 4 years (around 4/11/2026) for recheck.          Jayson Damon MD

## 2022-04-11 NOTE — PATIENT INSTRUCTIONS

## 2022-04-14 ENCOUNTER — TELEPHONE (OUTPATIENT)
Dept: SURGERY | Age: 47
End: 2022-04-14

## 2022-04-15 ENCOUNTER — OFFICE VISIT (OUTPATIENT)
Dept: SURGERY | Age: 47
End: 2022-04-15

## 2022-04-15 VITALS
WEIGHT: 183 LBS | BODY MASS INDEX: 28.72 KG/M2 | HEIGHT: 67 IN | HEART RATE: 82 BPM | SYSTOLIC BLOOD PRESSURE: 150 MMHG | DIASTOLIC BLOOD PRESSURE: 80 MMHG

## 2022-04-15 DIAGNOSIS — Z09 POSTOPERATIVE EXAMINATION: Primary | ICD-10-CM

## 2022-04-15 NOTE — PROGRESS NOTES
[address] [facility] Surgery      Clinic Note - Follow up    Subjective     Omar Fiore returns for scheduled follow up today. She is status post final suture removal.  She has been doing well. She has no new complaints. Objective     Visit Vitals  BP (!) 180/111 Comment: haven't taken her BP medcation   Pulse 82   Ht 5' 7\" (1.702 m)   Wt 183 lb (83 kg)   BMI 28.66 kg/m²         PE  GEN - Awake, alert, communicating appropriately. NAD  UPPER LIP - Good filling of defect. No signs of infection. Assessment     Omar Fiore is a 48 y. o.yr old female status post suture removal after having ED repair of complex laceration of upper lip. She is doing well. Plan     Recommended that she not apply make-up to the upper lip until she has complete healing. I have encouraged her to place Vaseline ointment to keep the area moist until it heals. She can follow-up as needed or for any concerns.     Dori Ramos MD

## 2022-04-25 ENCOUNTER — TELEPHONE (OUTPATIENT)
Dept: SURGERY | Age: 47
End: 2022-04-25

## 2022-04-25 NOTE — TELEPHONE ENCOUNTER
Called office to informed MD that her top lip where the sutures were she is experiencing some tingling,numbness and slight burning sensation at the location. MD told me tell patient that mostly likely its's her nervous is mending/healing.   He also told me to give it a while and every thing should go back to normal.

## 2022-06-22 ENCOUNTER — OFFICE VISIT (OUTPATIENT)
Dept: SURGERY | Age: 47
End: 2022-06-22
Payer: COMMERCIAL

## 2022-06-22 VITALS
SYSTOLIC BLOOD PRESSURE: 145 MMHG | HEART RATE: 70 BPM | HEIGHT: 67 IN | BODY MASS INDEX: 29.35 KG/M2 | WEIGHT: 187 LBS | DIASTOLIC BLOOD PRESSURE: 85 MMHG

## 2022-06-22 DIAGNOSIS — S01.511S: Primary | ICD-10-CM

## 2022-06-22 PROCEDURE — 99212 OFFICE O/P EST SF 10 MIN: CPT | Performed by: SURGERY

## 2022-06-22 NOTE — PROGRESS NOTES
Reddy Cartwright is a 50 y.o. female who presents today with the following:  Chief Complaint   Patient presents with    Follow-up     want to talk to MD about a plastic surgeon       HPI    57-year-old female who is known to me who on April 3 of this year sustained a laceration to her lip. This was repaired in the emergency department by the ED physician and she followed up with me for suture removal.  She has had ongoing healing and is relatively pleased with how her lip laceration is healed however she has developed a ridge on the left lateral lip at the level of vermilion border and desires referral to plastic surgery for consultation. Social History     Socioeconomic History    Marital status: LEGALLY      Spouse name: Not on file    Number of children: Not on file    Years of education: Not on file    Highest education level: Not on file   Occupational History    Not on file   Tobacco Use    Smoking status: Former Smoker    Smokeless tobacco: Never Used   Substance and Sexual Activity    Alcohol use: Not on file    Drug use: Not on file    Sexual activity: Not on file   Other Topics Concern    Not on file   Social History Narrative    Not on file     Social Determinants of Health     Financial Resource Strain:     Difficulty of Paying Living Expenses: Not on file   Food Insecurity:     Worried About [address] in the Last Year: Not on file    [place] of Food in the Last Year: Not on file   Transportation Needs:     Lack of Transportation (Medical): Not on file    Lack of Transportation (Non-Medical):  Not on file   Physical Activity:     Days of Exercise per Week: Not on file    Minutes of Exercise per Session: Not on file   Stress:     Feeling of Stress : Not on file   Social Connections:     Frequency of Communication with Friends and Family: Not on file    Frequency of Social Gatherings with Friends and Family: Not on file    Attends Jewish Services: Not on file   [facility] [employer] or Organizations: Not on file    Attends Club or Organization Meetings: Not on file    Marital Status: Not on file   Intimate Partner Violence:     Fear of Current or Ex-Partner: Not on file    Emotionally Abused: Not on file    Physically Abused: Not on file    Sexually Abused: Not on file   Housing Stability:     Unable to Pay for Housing in the Last Year: Not on file    Number of [place] in the Last Year: Not on file    Unstable Housing in the Last Year: Not on file       No family history on file. No Known Allergies    Current Outpatient Medications   Medication Sig    lisinopriL (PRINIVIL, ZESTRIL) 20 mg tablet Take 20 mg by mouth daily.  metoprolol succinate (TOPROL-XL) 100 mg tablet Take 25 mg by mouth daily.  nitroglycerin (NITROSTAT) 0.4 mg SL tablet Take 1 Tablet by mouth every five (5) minutes as needed for Chest Pain (x3) for up to 360 days.  beclomethasone (Qvar) 40 mcg/actuation aero Take 1 Puff by inhalation two (2) times a day. (Patient not taking: Reported on 4/8/2022)    amoxicillin-clavulanate (Augmentin) 875-125 mg per tablet Take 1 Tablet by mouth two (2) times a day. (Patient not taking: Reported on 4/11/2022)     No current facility-administered medications for this visit. The above histories, medications and allergies have been reviewed. ROS    Visit Vitals  BP (!) 145/85 Comment: bp check twice   Pulse 70   Ht 5' 7\" (1.702 m)   Wt 187 lb (84.8 kg)   BMI 29.29 kg/m²     Physical Exam    Left lateral upper lip with area of thickening in mid lip. 1. Postoperative examination  Overall good healing of lip laceration that was repaired in the emergency department but patient desires consultation with plastics. Referral will be made. Follow-up and Dispositions    · Return if symptoms worsen or fail to improve.          Mireya Gipson MD

## 2022-08-10 ENCOUNTER — TELEPHONE (OUTPATIENT)
Dept: FAMILY MEDICINE CLINIC | Age: 47
End: 2022-08-10

## 2022-08-10 NOTE — TELEPHONE ENCOUNTER
Pt canceled NP appt for 08. 12-lvm to reschedule.  She has to see Farooq Jarrett because she has [place]

## 2022-08-10 NOTE — TELEPHONE ENCOUNTER
----- Message from Daja Held sent at 8/10/2022 11:09 AM EDT -----  Subject: Appointment Request    Reason for Call: New Patient/New to Provider Appointment needed: New   Patient Request Appointment    QUESTIONS    Reason for appointment request? No appointments available during search     Additional Information for Provider? pt needs to reschedule her Est care   appt.   ---------------------------------------------------------------------------  --------------  [address] [facility]  [phone]; OK to leave message on voicemail  ---------------------------------------------------------------------------  --------------  SCRIPT ANSWERS  COVID Screen: Dino Choi

## 2022-09-21 ENCOUNTER — OFFICE VISIT (OUTPATIENT)
Dept: FAMILY MEDICINE CLINIC | Age: 47
End: 2022-09-21
Payer: COMMERCIAL

## 2022-09-21 ENCOUNTER — TELEPHONE (OUTPATIENT)
Dept: FAMILY MEDICINE CLINIC | Age: 47
End: 2022-09-21

## 2022-09-21 VITALS
HEIGHT: 67 IN | OXYGEN SATURATION: 97 % | RESPIRATION RATE: 18 BRPM | SYSTOLIC BLOOD PRESSURE: 142 MMHG | DIASTOLIC BLOOD PRESSURE: 80 MMHG | HEART RATE: 74 BPM | BODY MASS INDEX: 29.35 KG/M2 | TEMPERATURE: 97 F | WEIGHT: 187 LBS

## 2022-09-21 DIAGNOSIS — I10 PRIMARY HYPERTENSION: ICD-10-CM

## 2022-09-21 DIAGNOSIS — Z30.41 ORAL CONTRACEPTIVE USE: ICD-10-CM

## 2022-09-21 DIAGNOSIS — E78.00 PURE HYPERCHOLESTEROLEMIA: ICD-10-CM

## 2022-09-21 DIAGNOSIS — Z86.19 HISTORY OF COLD SORES: ICD-10-CM

## 2022-09-21 DIAGNOSIS — R63.5 WEIGHT GAIN: ICD-10-CM

## 2022-09-21 DIAGNOSIS — Z76.89 ENCOUNTER TO ESTABLISH CARE: Primary | ICD-10-CM

## 2022-09-21 PROCEDURE — 99203 OFFICE O/P NEW LOW 30 MIN: CPT | Performed by: NURSE PRACTITIONER

## 2022-09-21 RX ORDER — VALACYCLOVIR HYDROCHLORIDE 1 G/1
TABLET, FILM COATED ORAL
Qty: 10 TABLET | Refills: 1 | Status: SHIPPED | OUTPATIENT
Start: 2022-09-21

## 2022-09-21 RX ORDER — PHENTERMINE HYDROCHLORIDE 37.5 MG/1
TABLET ORAL
Qty: 30 TABLET | Refills: 0 | Status: SHIPPED | OUTPATIENT
Start: 2022-09-21

## 2022-09-21 RX ORDER — ATORVASTATIN CALCIUM 20 MG/1
20 TABLET, FILM COATED ORAL DAILY
COMMUNITY

## 2022-09-21 NOTE — PROGRESS NOTES
1. \"Have you been to the ER, urgent care clinic since your last visit? Hospitalized since your last visit? \" No    2. \"Have you seen or consulted any other health care providers outside of the [address] [place] [address] since your last visit? \" No     3. For patients aged 39-70: Has the patient had a colonoscopy / FIT/ Cologuard? No      If the patient is female:    4. For patients aged 41-77: Has the patient had a mammogram within the past 2 years? No      5. For patients aged 21-65: Has the patient had a pap smear?  No

## 2022-09-21 NOTE — TELEPHONE ENCOUNTER
PA is needed for Phentermine HCL 37.5mg Tablets    Key: [id number]  Last Name: Denver Romeo  : [phone]

## 2022-09-21 NOTE — PROGRESS NOTES
Subjective:     Chief Complaint   Patient presents with    New Patient       Luz Elena Garcia is a 50 y.o. female who presents today to establish care. Moved here with her 2 kids from [facility] a year ago. She is a realtor. She is overall very healthy. HTN  BP slightly elevated today. It was meant to be rechecked later during the appt but we forgot to do this. She takes Metoprolol 50mg daily. Denies CP, SOB, dizziness, or swelling. HLD  She is on Atorvastatin and last bloodwork was done in April. She exercises regularly at the  and follows a low fat diet. She does not wish to repeat any bloodwork today. Weight gain  She has gained about 40 pounds in the past year or so. States her baseline weight is 140 pounds. Weight today is 187. She has been seeing an OBGYN who started her on HRT for post-menopausal symptoms. However she ended up having a menstrual cycle after starting the HRT so he switched her to an OCP. She has been taking this since March. He checked her thyroid function, it was normal. She would like to take something to assist her with weight loss. She cannot fit into any of her fall clothes right now and does not want to have to buy another wardrobe. Cold sores  She does not get them often but when she gets one if she does not treat it ASAP with Acyclovir, it will rapidly worsen and more lesions will develop. She likes to keep a supply of Acyclovir on hand. They are worse during the summer. Health maintenance  PAP- done March this year (normal)  Mammo- states she has had one within last 2 years  Colon cancer- no FH, not interested in starting screening at this time  Covid vaccines- declines  Flu shot- plans on getting this soon      Patient Active Problem List   Diagnosis Code    Primary hypertension I10    Pure hypercholesterolemia E78.00    BMI 29.0-29.9,adult Z68.29    H/O cold sores Z86.19    Oral contraceptive use Z30.41         History reviewed.  No pertinent past medical history. Current Outpatient Medications:     metoprolol succinate (TOPROL-XL) 100 mg tablet, Take 25 mg by mouth daily. , Disp: , Rfl:     nitroglycerin (NITROSTAT) 0.4 mg SL tablet, Take 1 Tablet by mouth every five (5) minutes as needed for Chest Pain (x3) for up to 360 days. , Disp: 1 Each, Rfl: 0    No Known Allergies    History reviewed. No pertinent surgical history. Social History     Tobacco Use   Smoking Status Former   Smokeless Tobacco Never       Social History     Socioeconomic History    Marital status: LEGALLY    Tobacco Use    Smoking status: Former    Smokeless tobacco: Never       History reviewed. No pertinent family history. ROS:  Gen: denies fever, chills, or fatigue +40 # weight gain in past year  HEENT:denies H/A, nasal congestion, ear pan, or sore throat  Resp: denies dyspnea, cough, or wheezing  CV: denies chest pain, pressure, or palpitations  Extremeties: denies edema  GI[de-identified] denies abdominal pain, nausea, vomiting, diarrhea, or constipation  : denies dysuria, hematuria, urinary frequency or urgency  Musculoskeletal: no joint pain, stiffness, or muscle cramps  Neuro: denies numbness/tingling or dizziness  Skin: +hx of cold sores, denies rashes or new lesions   Psych: denies anxiety, depression, zo, or other changes in mood    Objective:     Visit Vitals  BP (!) 142/80 (BP 1 Location: Left upper arm, BP Patient Position: Sitting, BP Cuff Size: Adult)   Pulse 74   Temp 97 °F (36.1 °C) (Temporal)   Resp 18   Ht 5' 7\" (1.702 m)   Wt 187 lb (84.8 kg)   SpO2 97%   BMI 29.29 kg/m²     Body mass index is 29.29 kg/m². General: Alert and oriented. No acute distress. Well nourished. HEENT :  Ears:TMs normal. Canals clear. Eyes: Sclera white, conjunctiva clear. PERRLA. Extra ocular movements intact. Nose: Patent. Nasal mucosa pink, non-edematous, and without drainage. Mouth: Pharynx non-erythematous, without exudate   Neck: Supple with FROM.  No thyroid-megaly, carotid bruits, or lymphadenopathy  Lungs: Breathing even and unlabored. All lobes clear to auscultation bilaterally   Heart :RRR, S1 and S2 normal intensity, no extra heart sounds  Extremities: Non-edematous, no pallor or cyanosis. Abdomen: Soft and non-distended. Musculoskeletal: No joint pain, heat, erythema, or swelling. Neuro: Cranial nerves grossly normal.  Psych: Mood and thought content appropriate for situation. Dressed appropriately and with good hygiene. Skin: Warm, dry, and intact. No lesions or discoloration. Assessment/ Plan:     HTN  BP slightly elevated today. It was meant to be rechecked later during the appt but we forgot to do this. Cont Metoprolol 50mg daily. Cont exercise and low fat diet  RTO or go to ER for CP, SOB, dizziness, or swelling. F/U 1 month for BP recheck    HLD  Cont Atorvastatin   Cont low fat diet  Cont exercise  Will recheck lipids in 4-2023    Weight gain  She has gained about 40 pounds in the past year or so. Weight today is 187, BMI 29  She cannot lose weight with diet and exercise  Discussed weight loss med options such as phentermine and contrave  She is interested in Phentermine- script sent for 37.5mg tabs- take 1/2 tab every morning and every afternoon (#30,0R)   checked  Side effects reviewed- advised to stop medication and let me know for any CP, pressure, SOB, dizziness, or palpitations  F/U 1 month    Cold sores  Script sent for Acyclovir 2g BID x 1 day prn cold sores (#10,1R)    OCP use  F/U with OBGYN as scheduled      Health maintenance  PAP- done March this year (normal)  Mammo- states she has had one within last 2 years  Colon cancer- no FH, not interested in starting screening at this time  Covid vaccines- declines  Flu shot- plans on getting this soon        Verbal and written instructions (see AVS) provided. Patient expresses understanding of diagnosis and treatment plan. Beryle He.  Kaylee Farnsworth NP

## 2022-09-28 ENCOUNTER — HOSPITAL ENCOUNTER (OUTPATIENT)
Dept: GENERAL RADIOLOGY | Age: 47
Discharge: HOME | End: 2022-09-28
Payer: COMMERCIAL

## 2022-09-28 ENCOUNTER — VIRTUAL VISIT (OUTPATIENT)
Dept: FAMILY MEDICINE CLINIC | Age: 47
End: 2022-09-28
Payer: COMMERCIAL

## 2022-09-28 ENCOUNTER — LAB ONLY (OUTPATIENT)
Dept: FAMILY MEDICINE CLINIC | Age: 47
End: 2022-09-28
Payer: COMMERCIAL

## 2022-09-28 DIAGNOSIS — R06.02 SOB (SHORTNESS OF BREATH): ICD-10-CM

## 2022-09-28 DIAGNOSIS — U07.1 COVID-19: Primary | ICD-10-CM

## 2022-09-28 DIAGNOSIS — R05.9 COUGH: ICD-10-CM

## 2022-09-28 DIAGNOSIS — R05.9 COUGH: Primary | ICD-10-CM

## 2022-09-28 LAB
EXP DATE SOLUTION: ABNORMAL
EXP DATE SWAB: ABNORMAL
FLUAV+FLUBV AG NOSE QL IA.RAPID: NEGATIVE
FLUAV+FLUBV AG NOSE QL IA.RAPID: NEGATIVE
LOT NUMBER SOLUTION: ABNORMAL
LOT NUMBER SWAB: ABNORMAL
SARS-COV-2 RNA POC: POSITIVE
VALID INTERNAL CONTROL?: YES

## 2022-09-28 PROCEDURE — 71046 X-RAY EXAM CHEST 2 VIEWS: CPT

## 2022-09-28 PROCEDURE — 99442 PR PHYS/QHP TELEPHONE EVALUATION 11-20 MIN: CPT | Performed by: NURSE PRACTITIONER

## 2022-09-28 PROCEDURE — 87502 INFLUENZA DNA AMP PROBE: CPT | Performed by: NURSE PRACTITIONER

## 2022-09-28 PROCEDURE — 87635 SARS-COV-2 COVID-19 AMP PRB: CPT | Performed by: NURSE PRACTITIONER

## 2022-09-28 PROCEDURE — 87804 INFLUENZA ASSAY W/OPTIC: CPT | Performed by: NURSE PRACTITIONER

## 2022-09-28 RX ORDER — ALBUTEROL SULFATE 90 UG/1
2 AEROSOL, METERED RESPIRATORY (INHALATION)
Qty: 18 G | Refills: 0 | Status: SHIPPED | OUTPATIENT
Start: 2022-09-28

## 2022-09-28 RX ORDER — PREDNISONE 10 MG/1
TABLET ORAL
Qty: 21 TABLET | Refills: 0 | Status: SHIPPED | OUTPATIENT
Start: 2022-09-28

## 2022-09-28 NOTE — PROGRESS NOTES
Identified pt with two pt identifiers(name and ). Reviewed record in preparation for visit and have obtained necessary documentation. Chief Complaint   Patient presents with    Cough     sor    Sore Throat       1. \"Have you been to the ER, urgent care clinic since your last visit? Hospitalized since your last visit? \" No    2. \"Have you seen or consulted any other health care providers outside of the [address] [place] [address] since your last visit? \" No     3. For patients aged 39-70: Has the patient had a colonoscopy / FIT/ Cologuard? Yes - no Care Gap present      If the patient is female:    4. For patients aged 41-77: Has the patient had a mammogram within the past 2 years? Yes - no Care Gap present      5. For patients aged 21-65: Has the patient had a pap smear?  Yes - no Care Gap present      Symptom review:    NO  Fever   NO  Shaking chills  YES  Cough Non Productive  YES Headaches  YES  Body aches  NO  Coughing up blood  YES  Chest congestion  NO  Chest pain  NO  Shortness of breath  NO  Profound Loss of smell/taste  NO  Nausea/Vomiting   NO  Loose stool/Diarrhea  NO  any skin issues

## 2022-09-28 NOTE — PROGRESS NOTES
Pranay Stafford is a 50 y.o. female evaluated via telephone on 9/28/2022. Consent:  She and/or health care decision maker is aware that that she may receive a bill for this telephone service, depending on her insurance coverage, and has provided verbal consent to proceed: Yes    The patient was at home during the visit. I, the provider, was at the office during this visit. CC: sore throat, fever, chills    HPI  Kayce Kelsea is a 48yo female who presents today via telephone visit with c/o sore throat, nasal congestion, headaches, ear pain, chills, and fever 99.1. Also reports cough, chest congestion, and SOB. Symptoms started 5 days ago. She has not yet tested for Covid. Her  is also sick. Advised patient to come to clinic parking lot to be tested for Covid and the Flu. She verbalized understanding. PLAN    Cough and SOB  CXR ordered- will call with results  Flu test neg  Covid test positive    Covid 19  Script sent for prednisone dose pack   Start Albuterol inhaler- 2 puffs q 4 hours prn SOB  Increase fluids and rest  Quarantine for an addition 5 days  GO to ER for worsening SOB or new CP  F/U prn if symptoms worsen or do not improve. Documentation:  I communicated with the patient and/or health care decision maker about the plan of care as noted above. I affirm this is a Patient Initiated Episode with an Established Patient who has not had a related appointment within my department in the past 7 days or scheduled within the next 24 hours.     Total Time: minutes: 11-20 minutes    Note: not billable if this call serves to triage the patient into an appointment for the relevant concern      Delia Leonardo NP

## 2022-09-29 LAB
FLUAV+FLUBV AG NOSE QL IA.RAPID: NEGATIVE
FLUAV+FLUBV AG NOSE QL IA.RAPID: NEGATIVE
VALID INTERNAL CONTROL?: YES

## 2023-02-16 ENCOUNTER — HOSPITAL ENCOUNTER (EMERGENCY)
Age: 48
Discharge: HOME | End: 2023-02-16
Attending: EMERGENCY MEDICINE
Payer: COMMERCIAL

## 2023-02-16 ENCOUNTER — APPOINTMENT (OUTPATIENT)
Dept: GENERAL RADIOLOGY | Age: 48
End: 2023-02-16
Attending: EMERGENCY MEDICINE
Payer: COMMERCIAL

## 2023-02-16 VITALS
BODY MASS INDEX: 30.85 KG/M2 | HEART RATE: 82 BPM | OXYGEN SATURATION: 99 % | TEMPERATURE: 98.5 F | SYSTOLIC BLOOD PRESSURE: 150 MMHG | WEIGHT: 197 LBS | RESPIRATION RATE: 16 BRPM | DIASTOLIC BLOOD PRESSURE: 96 MMHG

## 2023-02-16 DIAGNOSIS — R07.89 ATYPICAL CHEST PAIN: ICD-10-CM

## 2023-02-16 DIAGNOSIS — M54.12 CERVICAL RADICULOPATHY: Primary | ICD-10-CM

## 2023-02-16 LAB
ALBUMIN SERPL-MCNC: 4.2 G/DL (ref 3.5–5)
ALBUMIN/GLOB SERPL: 1.1 (ref 1.1–2.2)
ALP SERPL-CCNC: 89 U/L (ref 45–117)
ALT SERPL-CCNC: 33 U/L (ref 12–78)
ANION GAP SERPL CALC-SCNC: 8 MMOL/L (ref 5–15)
AST SERPL-CCNC: 27 U/L (ref 15–37)
BASOPHILS # BLD: 0 K/UL (ref 0–0.1)
BASOPHILS NFR BLD: 0 % (ref 0–1)
BILIRUB SERPL-MCNC: 0.4 MG/DL (ref 0.2–1)
BNP SERPL-MCNC: 88 PG/ML (ref 0–125)
BUN SERPL-MCNC: 13 MG/DL (ref 6–20)
BUN/CREAT SERPL: 15 (ref 12–20)
CALCIUM SERPL-MCNC: 9.4 MG/DL (ref 8.5–10.1)
CHLORIDE SERPL-SCNC: 105 MMOL/L (ref 97–108)
CO2 SERPL-SCNC: 27 MMOL/L (ref 21–32)
CREAT SERPL-MCNC: 0.85 MG/DL (ref 0.55–1.02)
DIFFERENTIAL METHOD BLD: NORMAL
EOSINOPHIL # BLD: 0.1 K/UL (ref 0–0.4)
EOSINOPHIL NFR BLD: 1 % (ref 0–7)
ERYTHROCYTE [DISTWIDTH] IN BLOOD BY AUTOMATED COUNT: 12.5 % (ref 11.5–14.5)
GLOBULIN SER CALC-MCNC: 4 G/DL (ref 2–4)
GLUCOSE SERPL-MCNC: 82 MG/DL (ref 65–100)
HCT VFR BLD AUTO: 45 % (ref 35–47)
HGB BLD-MCNC: 15.1 G/DL (ref 11.5–16)
IMM GRANULOCYTES # BLD AUTO: 0 K/UL (ref 0–0.04)
IMM GRANULOCYTES NFR BLD AUTO: 0 % (ref 0–0.5)
LYMPHOCYTES # BLD: 1.8 K/UL (ref 0.8–3.5)
LYMPHOCYTES NFR BLD: 25 % (ref 12–49)
MCH RBC QN AUTO: 30 PG (ref 26–34)
MCHC RBC AUTO-ENTMCNC: 33.6 G/DL (ref 30–36.5)
MCV RBC AUTO: 89.5 FL (ref 80–99)
MONOCYTES # BLD: 0.5 K/UL (ref 0–1)
MONOCYTES NFR BLD: 7 % (ref 5–13)
NEUTS SEG # BLD: 4.8 K/UL (ref 1.8–8)
NEUTS SEG NFR BLD: 67 % (ref 32–75)
NRBC # BLD: 0 K/UL (ref 0–0.01)
NRBC BLD-RTO: 0 PER 100 WBC
PLATELET # BLD AUTO: 329 K/UL (ref 150–400)
PMV BLD AUTO: 9.7 FL (ref 8.9–12.9)
POTASSIUM SERPL-SCNC: 4.8 MMOL/L (ref 3.5–5.1)
PROT SERPL-MCNC: 8.2 G/DL (ref 6.4–8.2)
RBC # BLD AUTO: 5.03 M/UL (ref 3.8–5.2)
SODIUM SERPL-SCNC: 140 MMOL/L (ref 136–145)
TROPONIN I SERPL HS-MCNC: 6 NG/L (ref 0–51)
WBC # BLD AUTO: 7.1 K/UL (ref 3.6–11)

## 2023-02-16 PROCEDURE — 71045 X-RAY EXAM CHEST 1 VIEW: CPT

## 2023-02-16 PROCEDURE — 80053 COMPREHEN METABOLIC PANEL: CPT

## 2023-02-16 PROCEDURE — 72050 X-RAY EXAM NECK SPINE 4/5VWS: CPT

## 2023-02-16 PROCEDURE — 99285 EMERGENCY DEPT VISIT HI MDM: CPT

## 2023-02-16 PROCEDURE — 84484 ASSAY OF TROPONIN QUANT: CPT

## 2023-02-16 PROCEDURE — 83880 ASSAY OF NATRIURETIC PEPTIDE: CPT

## 2023-02-16 PROCEDURE — 85025 COMPLETE CBC W/AUTO DIFF WBC: CPT

## 2023-02-16 PROCEDURE — 36415 COLL VENOUS BLD VENIPUNCTURE: CPT

## 2023-02-16 RX ORDER — PREDNISONE 10 MG/1
TABLET ORAL
Qty: 48 TABLET | Refills: 0 | Status: SHIPPED | OUTPATIENT
Start: 2023-02-16

## 2023-02-16 RX ORDER — CYCLOBENZAPRINE HCL 10 MG
10 TABLET ORAL
Qty: 30 TABLET | Refills: 0 | Status: SHIPPED | OUTPATIENT
Start: 2023-02-16

## 2023-02-16 NOTE — ED PROVIDER NOTES
EMERGENCY DEPARTMENT HISTORY AND PHYSICAL EXAM      Date: 2/16/2023  Patient Name: Kortney Webb    History of Presenting Illness     Chief Complaint   Patient presents with    Arm Pain       History Provided By: Patient    HPI: Kortney Webb, 50 y.o. female with PMHx significant for CAD, hypertension, presents ambulatory to the ED with cc of left arm pain. Patient reports she has had left arm pain for the past 2 weeks. Pain is diffusely in her arm down to her fingers. Pain is been constant. No modifying factors. Minimal improvement with ibuprofen and Tylenol. States the pain radiates into her left neck and left upper chest as well. She denies any shortness of breath or diaphoresis. She reports a history of CAD. She reports her first heart attack was at 38years old. She is followed by Dr. Naina Bernard with cardiology at [facility]. She states she has had over 9 stents. Strong family history in father of early CAD. She thought the pain was musculoskeletal, but she was unaware of any cause. She denies any falls or trauma or increased physical activity. After 2 weeks she thought it was time to get it checked out. Pain is a constant ache. PMHx: Significant for CAD, hypertension  PSHx: Significant for coronary stents. Social Hx: Former smoker. There are no other complaints, changes, or physical findings at this time. PCP: Ivett Poon NP    No current facility-administered medications on file prior to encounter. Current Outpatient Medications on File Prior to Encounter   Medication Sig Dispense Refill    albuterol (PROVENTIL HFA, VENTOLIN HFA, PROAIR HFA) 90 mcg/actuation inhaler Take 2 Puffs by inhalation every four (4) hours as needed for Wheezing. 18 g 0    [DISCONTINUED] predniSONE (STERAPRED DS) 10 mg dose pack See administration instruction per 10mg dose pack 21 Tablet 0    atorvastatin (LIPITOR) 20 mg tablet Take 20 mg by mouth daily.  Patient taking 30mg 1 1/2 valACYclovir (VALTREX) 1 gram tablet Take 2 pills twice daily x 1 day as needed for cold sore 10 Tablet 1    phentermine (ADIPEX-P) 37.5 mg tablet Take 1/2 pill every morning and afternoon  Indications: weight loss management for overweight person with bmi 27 to 29 and weight-related comorbidity 30 Tablet 0    metoprolol succinate (TOPROL-XL) 100 mg tablet Take 25 mg by mouth daily. Past History     Past Medical History:  History reviewed. No pertinent past medical history. Past Surgical History:  No past surgical history on file. Family History:  History reviewed. No pertinent family history. Social History:  Social History     Tobacco Use    Smoking status: Former    Smokeless tobacco: Never       Allergies:  No Known Allergies      Review of Systems   Review of Systems   Constitutional:  Negative for activity change, chills and fever. HENT:  Negative for congestion and sore throat. Eyes:  Negative for pain and redness. Respiratory:  Negative for cough, chest tightness and shortness of breath. Cardiovascular:  Positive for chest pain. Negative for palpitations. Gastrointestinal:  Negative for abdominal pain, diarrhea, nausea and vomiting. Genitourinary:  Negative for dysuria, frequency and urgency. Musculoskeletal:  Negative for back pain and neck pain. Skin:  Negative for rash. Neurological:  Negative for syncope, light-headedness and headaches. Psychiatric/Behavioral:  Negative for confusion. All other systems reviewed and are negative. Physical Exam   Physical Exam  Vitals and nursing note reviewed. Constitutional:       General: She is not in acute distress. Appearance: She is well-developed. She is not diaphoretic. HENT:      Head: Normocephalic. Nose: Nose normal.      Mouth/Throat:      Pharynx: No oropharyngeal exudate. Eyes:      General: No scleral icterus.      Conjunctiva/sclera: Conjunctivae normal.      Pupils: Pupils are equal, round, and reactive to light. Neck:      Thyroid: No thyromegaly. Vascular: No JVD. Trachea: No tracheal deviation. Cardiovascular:      Rate and Rhythm: Normal rate and regular rhythm. Heart sounds: No murmur heard. No friction rub. No gallop. Pulmonary:      Effort: Pulmonary effort is normal. No respiratory distress. Breath sounds: Normal breath sounds. No stridor. No wheezing or rales. Abdominal:      General: Bowel sounds are normal. There is no distension. Palpations: Abdomen is soft. Tenderness: There is no abdominal tenderness. There is no guarding or rebound. Musculoskeletal:         General: Normal range of motion. Cervical back: Normal range of motion and neck supple. Comments: Left upper extremity without rash or swelling. Motor and sensory grossly intact. 2+ radial and ulnar pulses. Full range of motion throughout. Brisk CR. Good  strength. Flexes and extends all digits. Abducts and abducts fingers. Opposes thumb and index finger. Lymphadenopathy:      Cervical: No cervical adenopathy. Skin:     General: Skin is warm and dry. Findings: No erythema or rash. Neurological:      Mental Status: She is alert and oriented to person, place, and time. Cranial Nerves: No cranial nerve deficit. Motor: No abnormal muscle tone.       Coordination: Coordination normal.   Psychiatric:         Behavior: Behavior normal.           Diagnostic Study Results     Labs -     Recent Results (from the past 12 hour(s))   CBC WITH AUTOMATED DIFF    Collection Time: 02/16/23  2:52 PM   Result Value Ref Range    WBC 7.1 3.6 - 11.0 K/uL    RBC 5.03 3.80 - 5.20 M/uL    HGB 15.1 11.5 - 16.0 g/dL    HCT 45.0 35.0 - 47.0 %    MCV 89.5 80.0 - 99.0 FL    MCH 30.0 26.0 - 34.0 PG    MCHC 33.6 30.0 - 36.5 g/dL    RDW 12.5 11.5 - 14.5 %    PLATELET 375 283 - 437 K/uL    MPV 9.7 8.9 - 12.9 FL    NRBC 0.0 0  WBC    ABSOLUTE NRBC 0.00 0.00 - 0.01 K/uL NEUTROPHILS 67 32 - 75 %    LYMPHOCYTES 25 12 - 49 %    MONOCYTES 7 5 - 13 %    EOSINOPHILS 1 0 - 7 %    BASOPHILS 0 0 - 1 %    IMMATURE GRANULOCYTES 0 0.0 - 0.5 %    ABS. NEUTROPHILS 4.8 1.8 - 8.0 K/UL    ABS. LYMPHOCYTES 1.8 0.8 - 3.5 K/UL    ABS. MONOCYTES 0.5 0.0 - 1.0 K/UL    ABS. EOSINOPHILS 0.1 0.0 - 0.4 K/UL    ABS. BASOPHILS 0.0 0.0 - 0.1 K/UL    ABS. IMM. GRANS. 0.0 0.00 - 0.04 K/UL    DF AUTOMATED     METABOLIC PANEL, COMPREHENSIVE    Collection Time: 02/16/23  2:52 PM   Result Value Ref Range    Sodium 140 136 - 145 mmol/L    Potassium 4.8 3.5 - 5.1 mmol/L    Chloride 105 97 - 108 mmol/L    CO2 27 21 - 32 mmol/L    Anion gap 8 5 - 15 mmol/L    Glucose 82 65 - 100 mg/dL    BUN 13 6 - 20 MG/DL    Creatinine 0.85 0.55 - 1.02 MG/DL    BUN/Creatinine ratio 15 12 - 20      eGFR >60 >60 ml/min/1.73m2    Calcium 9.4 8.5 - 10.1 MG/DL    Bilirubin, total 0.4 0.2 - 1.0 MG/DL    ALT (SGPT) 33 12 - 78 U/L    AST (SGOT) 27 15 - 37 U/L    Alk. phosphatase 89 45 - 117 U/L    Protein, total 8.2 6.4 - 8.2 g/dL    Albumin 4.2 3.5 - 5.0 g/dL    Globulin 4.0 2.0 - 4.0 g/dL    A-G Ratio 1.1 1.1 - 2.2     TROPONIN-HIGH SENSITIVITY    Collection Time: 02/16/23  2:52 PM   Result Value Ref Range    Troponin-High Sensitivity 6 0 - 51 ng/L   NT-PRO BNP    Collection Time: 02/16/23  2:52 PM   Result Value Ref Range    NT pro-BNP 88 0 - 125 PG/ML       Radiologic Studies -   XR CHEST SNGL V   Final Result   1. No radiographic evidence of acute cardiopulmonary disease. XR SPINE CERV 4 OR 5 V   Final Result   1. Multilevel degenerative change        CT Results  (Last 48 hours)      None          CXR Results  (Last 48 hours)                 02/16/23 1514  XR CHEST SNGL V Final result    Impression:  1. No radiographic evidence of acute cardiopulmonary disease. Narrative:  INDICATION: . Chest Pain   Additional history:   COMPARISON: Previous chest xray, 9/28/2022. LIMITATIONS: Portable technique. Ruddy Pride FINDINGS: Single frontal view of the chest.    .   Lines/tubes/surgical: Cardiac monitor leads overly the patient. Heart/mediastinum: Unremarkable. Lungs/pleura:  No focal consolidation or mass. No visualized pleural effusion or   pneumothorax. Additional Comments: Degenerative changes in the spine. .                 Medical Decision Making   I am the first provider for this patient. I reviewed the vital signs, available nursing notes, past medical history, past surgical history, family history and social history. Vital Signs-Reviewed the patient's vital signs. Patient Vitals for the past 12 hrs:   Temp Pulse Resp BP SpO2   02/16/23 1547 -- 82 16 (!) 150/96 99 %   02/16/23 1507 -- 86 16 (!) 168/66 97 %   02/16/23 1427 98.5 °F (36.9 °C) 80 18 (!) 180/118 97 %       Pulse Oximetry Analysis - 97% on RA    Cardiac Monitor:   Rate: 80 bpm  Rhythm: Normal Sinus Rhythm        ED EKG interpretation: 2:41 PM  Rhythm: normal sinus rhythm; and regular . Rate (approx.): 87; Axis: normal; AR Interval: normal; QRS interval: normal ; ST/T wave: non-specific changes; LVH by voltage criteria. This EKG was interpreted by Madina Salter MD,ED Provider. Records Reviewed: Prior medical records and Nursing notes    Provider Notes (Medical Decision    MDM: 49-year-old female with extensive history of CAD with left arm pain for 2 weeks. Pain has been constant no trauma or known cause. Suspect cervical radiculopathy. However due to patient's strong CAD history will obtain cardiac work-up including EKG, troponin and basic labs. Will obtain plain films of chest and C-spine. ED Course:   Initial assessment performed. The patients presenting problems have been discussed, and they are in agreement with the care plan formulated and outlined with them. I have encouraged them to ask questions as they arise throughout their visit. PROGRESS NOTE    Pt reevaluated.   Suspect cervical radiculopathy as cause of pain.  No acute EKG changes. Negative troponin after 2 weeks of near constant pain. Will discharge with prednisone and Flexeril. Recommended cardiology follow-up. Written by Carlos A Álvarez MD     Progress note:    Pt noted to be feeling better , ready for discharge. Updated pt and/or family on all final lab and imaging findings. Will follow up as instructed. All questions have been answered, pt voiced understanding and agreement with plan. Specific return precautions provided as well as instructions to return to the ED should sx worsen at any time. Vital signs stable for discharge. I have also put together some discharge instructions for them that include: 1) educational information regarding their diagnosis, 2) how to care for their diagnosis at home, as well a 3) list of reasons why they would want to return to the ED prior to their follow-up appointment, should their condition change. Written by Carlos A Álvarez MD        Critical Care Time:   0    Disposition:  Discharge    PLAN:  1. Discharge Medication List as of 2/16/2023  3:43 PM        START taking these medications    Details   cyclobenzaprine (FLEXERIL) 10 mg tablet Take 1 Tablet by mouth three (3) times daily as needed for Muscle Spasm(s). , Normal, Disp-30 Tablet, R-0           CONTINUE these medications which have CHANGED    Details   predniSONE (STERAPRED DS) 10 mg dose pack Take as directed, Normal, Disp-48 Tablet, R-0           CONTINUE these medications which have NOT CHANGED    Details   albuterol (PROVENTIL HFA, VENTOLIN HFA, PROAIR HFA) 90 mcg/actuation inhaler Take 2 Puffs by inhalation every four (4) hours as needed for Wheezing., Normal, Disp-18 g, R-0      atorvastatin (LIPITOR) 20 mg tablet Take 20 mg by mouth daily.  Patient taking 30mg 1 1/2, Historical Med      valACYclovir (VALTREX) 1 gram tablet Take 2 pills twice daily x 1 day as needed for cold sore, Normal, Disp-10 Tablet, R-1      phentermine (ADIPEX-P) 37.5 mg tablet Take 1/2 pill every morning and afternoon  Indications: weight loss management for overweight person with bmi 27 to 29 and weight-related comorbidity, Normal, Disp-30 Tablet, R-0      metoprolol succinate (TOPROL-XL) 100 mg tablet Take 25 mg by mouth daily. , Historical Med           2. Follow-up Information       Follow up With Specialties Details Why Contact Info    Jamar Jackson NP Nurse Practitioner Schedule an appointment as soon as possible for a visit in 1 week  [address] [place] [address]  [phone]            Return to ED if worse     Diagnosis     Clinical Impression:   1. Cervical radiculopathy    2. Atypical chest pain              Please note that this dictation was completed with [employer], the computer voice recognition software. Quite often unanticipated grammatical, syntax, homophones, and other interpretive errors are inadvertently transcribed by the computer software. Please disregard these errors. Please excuse any errors that have escaped final proofreading.

## 2023-02-16 NOTE — ED TRIAGE NOTES
Pt arrived with c/o left shoulder, arm, scapula and chest pain that has been going on for the past 2 weeks, has had no N,V,D.  Has been taking ibuprofen for the pain with no relief, Has an extensive hx of heart problems

## 2023-02-17 LAB
ATRIAL RATE: 87 BPM
CALCULATED P AXIS, ECG09: 46 DEGREES
CALCULATED R AXIS, ECG10: -2 DEGREES
CALCULATED T AXIS, ECG11: 34 DEGREES
DIAGNOSIS, 93000: NORMAL
P-R INTERVAL, ECG05: 124 MS
Q-T INTERVAL, ECG07: 360 MS
QRS DURATION, ECG06: 100 MS
QTC CALCULATION (BEZET), ECG08: 433 MS
VENTRICULAR RATE, ECG03: 87 BPM

## 2023-11-08 DIAGNOSIS — Z86.19 PERSONAL HISTORY OF OTHER INFECTIOUS AND PARASITIC DISEASES: ICD-10-CM

## 2023-11-08 RX ORDER — VALACYCLOVIR HYDROCHLORIDE 1 G/1
TABLET, FILM COATED ORAL
Qty: 10 TABLET | Refills: 1 | OUTPATIENT
Start: 2023-11-08

## 2023-11-16 ENCOUNTER — OFFICE VISIT (OUTPATIENT)
Age: 48
End: 2023-11-16
Payer: COMMERCIAL

## 2023-11-16 VITALS
DIASTOLIC BLOOD PRESSURE: 74 MMHG | HEART RATE: 68 BPM | BODY MASS INDEX: 31.36 KG/M2 | TEMPERATURE: 98.8 F | SYSTOLIC BLOOD PRESSURE: 131 MMHG | HEIGHT: 67 IN | RESPIRATION RATE: 16 BRPM | OXYGEN SATURATION: 95 % | WEIGHT: 199.8 LBS

## 2023-11-16 DIAGNOSIS — B00.1 RECURRENT COLD SORES: ICD-10-CM

## 2023-11-16 DIAGNOSIS — Z23 NEED FOR VACCINATION: ICD-10-CM

## 2023-11-16 DIAGNOSIS — E78.00 PURE HYPERCHOLESTEROLEMIA, UNSPECIFIED: ICD-10-CM

## 2023-11-16 DIAGNOSIS — I10 ESSENTIAL (PRIMARY) HYPERTENSION: Primary | ICD-10-CM

## 2023-11-16 PROCEDURE — 3078F DIAST BP <80 MM HG: CPT | Performed by: NURSE PRACTITIONER

## 2023-11-16 PROCEDURE — 3075F SYST BP GE 130 - 139MM HG: CPT | Performed by: NURSE PRACTITIONER

## 2023-11-16 PROCEDURE — 99214 OFFICE O/P EST MOD 30 MIN: CPT | Performed by: NURSE PRACTITIONER

## 2023-11-16 RX ORDER — VALACYCLOVIR HYDROCHLORIDE 1 G/1
TABLET, FILM COATED ORAL
Qty: 4 TABLET | Refills: 5 | Status: SHIPPED | OUTPATIENT
Start: 2023-11-16

## 2023-11-16 SDOH — ECONOMIC STABILITY: FOOD INSECURITY: WITHIN THE PAST 12 MONTHS, THE FOOD YOU BOUGHT JUST DIDN'T LAST AND YOU DIDN'T HAVE MONEY TO GET MORE.: NEVER TRUE

## 2023-11-16 SDOH — ECONOMIC STABILITY: FOOD INSECURITY: WITHIN THE PAST 12 MONTHS, YOU WORRIED THAT YOUR FOOD WOULD RUN OUT BEFORE YOU GOT MONEY TO BUY MORE.: NEVER TRUE

## 2023-11-16 SDOH — ECONOMIC STABILITY: HOUSING INSECURITY
IN THE LAST 12 MONTHS, WAS THERE A TIME WHEN YOU DID NOT HAVE A STEADY PLACE TO SLEEP OR SLEPT IN A SHELTER (INCLUDING NOW)?: NO

## 2023-11-16 SDOH — ECONOMIC STABILITY: INCOME INSECURITY: HOW HARD IS IT FOR YOU TO PAY FOR THE VERY BASICS LIKE FOOD, HOUSING, MEDICAL CARE, AND HEATING?: NOT HARD AT ALL

## 2023-11-16 ASSESSMENT — PATIENT HEALTH QUESTIONNAIRE - PHQ9
SUM OF ALL RESPONSES TO PHQ QUESTIONS 1-9: 0
1. LITTLE INTEREST OR PLEASURE IN DOING THINGS: 0
SUM OF ALL RESPONSES TO PHQ QUESTIONS 1-9: 0
SUM OF ALL RESPONSES TO PHQ QUESTIONS 1-9: 0
2. FEELING DOWN, DEPRESSED OR HOPELESS: 0
SUM OF ALL RESPONSES TO PHQ QUESTIONS 1-9: 0
SUM OF ALL RESPONSES TO PHQ QUESTIONS 1-9: 0
SUM OF ALL RESPONSES TO PHQ9 QUESTIONS 1 & 2: 0
SUM OF ALL RESPONSES TO PHQ QUESTIONS 1-9: 0
SUM OF ALL RESPONSES TO PHQ QUESTIONS 1-9: 0
1. LITTLE INTEREST OR PLEASURE IN DOING THINGS: 0
2. FEELING DOWN, DEPRESSED OR HOPELESS: 0
SUM OF ALL RESPONSES TO PHQ9 QUESTIONS 1 & 2: 0
SUM OF ALL RESPONSES TO PHQ QUESTIONS 1-9: 0

## 2024-08-27 ENCOUNTER — APPOINTMENT (OUTPATIENT)
Facility: HOSPITAL | Age: 49
End: 2024-08-27
Payer: COMMERCIAL

## 2024-08-27 ENCOUNTER — HOSPITAL ENCOUNTER (EMERGENCY)
Facility: HOSPITAL | Age: 49
Discharge: HOME | End: 2024-08-27
Attending: EMERGENCY MEDICINE
Payer: COMMERCIAL

## 2024-08-27 VITALS
HEIGHT: 67 IN | BODY MASS INDEX: 26.37 KG/M2 | RESPIRATION RATE: 18 BRPM | SYSTOLIC BLOOD PRESSURE: 154 MMHG | OXYGEN SATURATION: 99 % | TEMPERATURE: 98.7 F | WEIGHT: 168 LBS | HEART RATE: 100 BPM | DIASTOLIC BLOOD PRESSURE: 101 MMHG

## 2024-08-27 DIAGNOSIS — R31.9 URINARY TRACT INFECTION WITH HEMATURIA, SITE UNSPECIFIED: Primary | ICD-10-CM

## 2024-08-27 DIAGNOSIS — N39.0 URINARY TRACT INFECTION WITH HEMATURIA, SITE UNSPECIFIED: Primary | ICD-10-CM

## 2024-08-27 LAB
ALBUMIN SERPL-MCNC: 4.2 G/DL (ref 3.5–5)
ALBUMIN/GLOB SERPL: 1.4 (ref 1.1–2.2)
ALP SERPL-CCNC: 104 U/L (ref 45–117)
ALT SERPL-CCNC: 27 U/L (ref 12–78)
ANION GAP SERPL CALC-SCNC: 8 MMOL/L (ref 5–15)
APPEARANCE UR: CLEAR
AST SERPL-CCNC: 36 U/L (ref 15–37)
BACTERIA URNS QL MICRO: ABNORMAL /HPF
BASOPHILS # BLD: 0 K/UL (ref 0–0.1)
BASOPHILS NFR BLD: 0 % (ref 0–1)
BILIRUB SERPL-MCNC: 0.8 MG/DL (ref 0.2–1)
BILIRUB UR QL CFM: NEGATIVE
BUN SERPL-MCNC: 16 MG/DL (ref 6–20)
BUN/CREAT SERPL: 22 (ref 12–20)
CALCIUM SERPL-MCNC: 9.4 MG/DL (ref 8.5–10.1)
CHLORIDE SERPL-SCNC: 103 MMOL/L (ref 97–108)
CO2 SERPL-SCNC: 27 MMOL/L (ref 21–32)
COLOR UR: ABNORMAL
CREAT SERPL-MCNC: 0.74 MG/DL (ref 0.55–1.02)
DIFFERENTIAL METHOD BLD: NORMAL
EOSINOPHIL # BLD: 0.1 K/UL (ref 0–0.4)
EOSINOPHIL NFR BLD: 1 % (ref 0–7)
EPITH CASTS URNS QL MICRO: ABNORMAL /LPF
ERYTHROCYTE [DISTWIDTH] IN BLOOD BY AUTOMATED COUNT: 13.3 % (ref 11.5–14.5)
GLOBULIN SER CALC-MCNC: 3.1 G/DL (ref 2–4)
GLUCOSE SERPL-MCNC: 91 MG/DL (ref 65–100)
GLUCOSE UR STRIP.AUTO-MCNC: NEGATIVE MG/DL
HCT VFR BLD AUTO: 41.5 % (ref 35–47)
HGB BLD-MCNC: 14.1 G/DL (ref 11.5–16)
HGB UR QL STRIP: NEGATIVE
IMM GRANULOCYTES # BLD AUTO: 0 K/UL (ref 0–0.04)
IMM GRANULOCYTES NFR BLD AUTO: 0 % (ref 0–0.5)
KETONES UR QL STRIP.AUTO: NEGATIVE MG/DL
LEUKOCYTE ESTERASE UR QL STRIP.AUTO: NEGATIVE
LYMPHOCYTES # BLD: 1.9 K/UL (ref 0.8–3.5)
LYMPHOCYTES NFR BLD: 28 % (ref 12–49)
MCH RBC QN AUTO: 30.4 PG (ref 26–34)
MCHC RBC AUTO-ENTMCNC: 34 G/DL (ref 30–36.5)
MCV RBC AUTO: 89.4 FL (ref 80–99)
MONOCYTES # BLD: 0.5 K/UL (ref 0–1)
MONOCYTES NFR BLD: 7 % (ref 5–13)
NEUTS SEG # BLD: 4.4 K/UL (ref 1.8–8)
NEUTS SEG NFR BLD: 64 % (ref 32–75)
NITRITE UR QL STRIP.AUTO: NEGATIVE
NRBC # BLD: 0 K/UL (ref 0–0.01)
NRBC BLD-RTO: 0 PER 100 WBC
PH UR STRIP: 6 (ref 5–8)
PLATELET # BLD AUTO: 287 K/UL (ref 150–400)
PMV BLD AUTO: 9.9 FL (ref 8.9–12.9)
POTASSIUM SERPL-SCNC: 4.8 MMOL/L (ref 3.5–5.1)
PROT SERPL-MCNC: 7.3 G/DL (ref 6.4–8.2)
PROT UR STRIP-MCNC: ABNORMAL MG/DL
RBC # BLD AUTO: 4.64 M/UL (ref 3.8–5.2)
RBC #/AREA URNS HPF: ABNORMAL /HPF (ref 0–5)
SODIUM SERPL-SCNC: 138 MMOL/L (ref 136–145)
SP GR UR REFRACTOMETRY: 1.03 (ref 1–1.03)
URINE CULTURE IF INDICATED: ABNORMAL
UROBILINOGEN UR QL STRIP.AUTO: 0.2 EU/DL (ref 0.2–1)
WBC # BLD AUTO: 6.9 K/UL (ref 3.6–11)
WBC URNS QL MICRO: ABNORMAL /HPF (ref 0–4)

## 2024-08-27 PROCEDURE — 81001 URINALYSIS AUTO W/SCOPE: CPT

## 2024-08-27 PROCEDURE — 6360000002 HC RX W HCPCS: Performed by: EMERGENCY MEDICINE

## 2024-08-27 PROCEDURE — 96375 TX/PRO/DX INJ NEW DRUG ADDON: CPT

## 2024-08-27 PROCEDURE — 80053 COMPREHEN METABOLIC PANEL: CPT

## 2024-08-27 PROCEDURE — 36415 COLL VENOUS BLD VENIPUNCTURE: CPT

## 2024-08-27 PROCEDURE — 2580000003 HC RX 258: Performed by: EMERGENCY MEDICINE

## 2024-08-27 PROCEDURE — 74176 CT ABD & PELVIS W/O CONTRAST: CPT

## 2024-08-27 PROCEDURE — 6370000000 HC RX 637 (ALT 250 FOR IP): Performed by: EMERGENCY MEDICINE

## 2024-08-27 PROCEDURE — 85025 COMPLETE CBC W/AUTO DIFF WBC: CPT

## 2024-08-27 PROCEDURE — 96374 THER/PROPH/DIAG INJ IV PUSH: CPT

## 2024-08-27 PROCEDURE — 87086 URINE CULTURE/COLONY COUNT: CPT

## 2024-08-27 PROCEDURE — 99284 EMERGENCY DEPT VISIT MOD MDM: CPT

## 2024-08-27 RX ORDER — PHENAZOPYRIDINE HYDROCHLORIDE 200 MG/1
200 TABLET, FILM COATED ORAL 3 TIMES DAILY PRN
Qty: 6 TABLET | Refills: 0 | Status: SHIPPED | OUTPATIENT
Start: 2024-08-27 | End: 2024-08-30

## 2024-08-27 RX ORDER — KETOROLAC TROMETHAMINE 15 MG/ML
15 INJECTION, SOLUTION INTRAMUSCULAR; INTRAVENOUS
Status: COMPLETED | OUTPATIENT
Start: 2024-08-27 | End: 2024-08-27

## 2024-08-27 RX ORDER — PHENAZOPYRIDINE HYDROCHLORIDE 100 MG/1
200 TABLET, FILM COATED ORAL
Status: COMPLETED | OUTPATIENT
Start: 2024-08-27 | End: 2024-08-27

## 2024-08-27 RX ORDER — ONDANSETRON 4 MG/1
4 TABLET, ORALLY DISINTEGRATING ORAL 3 TIMES DAILY PRN
Qty: 21 TABLET | Refills: 0 | Status: SHIPPED | OUTPATIENT
Start: 2024-08-27

## 2024-08-27 RX ORDER — 0.9 % SODIUM CHLORIDE 0.9 %
500 INTRAVENOUS SOLUTION INTRAVENOUS ONCE
Status: COMPLETED | OUTPATIENT
Start: 2024-08-27 | End: 2024-08-27

## 2024-08-27 RX ORDER — CEPHALEXIN 500 MG/1
500 CAPSULE ORAL 3 TIMES DAILY
Qty: 21 CAPSULE | Refills: 0 | Status: SHIPPED | OUTPATIENT
Start: 2024-08-27 | End: 2024-09-03

## 2024-08-27 RX ADMIN — WATER 1000 MG: 1 INJECTION INTRAMUSCULAR; INTRAVENOUS; SUBCUTANEOUS at 14:09

## 2024-08-27 RX ADMIN — KETOROLAC TROMETHAMINE 15 MG: 15 INJECTION, SOLUTION INTRAMUSCULAR; INTRAVENOUS at 12:51

## 2024-08-27 RX ADMIN — SODIUM CHLORIDE 500 ML: 9 INJECTION, SOLUTION INTRAVENOUS at 12:48

## 2024-08-27 RX ADMIN — PHENAZOPYRIDINE 200 MG: 100 TABLET ORAL at 15:09

## 2024-08-27 ASSESSMENT — LIFESTYLE VARIABLES
HOW OFTEN DO YOU HAVE A DRINK CONTAINING ALCOHOL: NEVER
HOW MANY STANDARD DRINKS CONTAINING ALCOHOL DO YOU HAVE ON A TYPICAL DAY: PATIENT DOES NOT DRINK

## 2024-08-27 ASSESSMENT — PAIN DESCRIPTION - LOCATION
LOCATION: ABDOMEN

## 2024-08-27 ASSESSMENT — PAIN - FUNCTIONAL ASSESSMENT: PAIN_FUNCTIONAL_ASSESSMENT: 0-10

## 2024-08-27 ASSESSMENT — PAIN DESCRIPTION - DESCRIPTORS
DESCRIPTORS: CRAMPING;SQUEEZING
DESCRIPTORS: DISCOMFORT

## 2024-08-27 ASSESSMENT — PAIN SCALES - GENERAL
PAINLEVEL_OUTOF10: 8
PAINLEVEL_OUTOF10: 8
PAINLEVEL_OUTOF10: 3

## 2024-08-27 ASSESSMENT — PAIN DESCRIPTION - ORIENTATION
ORIENTATION: RIGHT;LEFT;LOWER
ORIENTATION: LOWER;LEFT;RIGHT
ORIENTATION: LOWER

## 2024-08-27 NOTE — ED TRIAGE NOTES
Pt arrived with complaint of bladder infection.  Pt reports she last urinated at 0830 this morning and now she has retention with pain and burning, started an 1 1/2 ago.  Pt is awake alert and oriented X 4, pt educated on ER flow.  Pt educated on need for a urine

## 2024-08-27 NOTE — ED PROVIDER NOTES
stone, ovarian cyst/rupture    Will check labs including UA, CBC, CMP, stone study    Labs reviewed.  CMP is unremarkable.  CBC shows normal white count and normal hemoglobin.  UA shows 10-20 white blood cells, 10-20 red blood cells, moderate epithelial cells, and 1+ bacteria.  Negative for nitrates and leuk esterase.  Stone study obtained which does not show any appendicitis, or urinary tract stone.  Bladder scan showed less than 100 cc.  Will treat for UTI.  Patient given Rocephin in the ED and Pyridium and prescribed Keflex and Pyridium.  Return to ED for worsening symptoms including intractable pain, fever, intractable vomiting, or inability to urinate.             FINAL IMPRESSION     1. Urinary tract infection with hematuria, site unspecified          DISPOSITION/PLAN   DISPOSITION Decision To Discharge 08/27/2024 02:54:19 PM  Condition at Disposition: Stable    Discharge     PATIENT REFERRED TO:  [facility] EMERGENCY DEP  [address] [facility] [address]  [phone]    If symptoms worsen    Starr Gleason, APRN - NP  [address] [facility] [address]  [phone]    Schedule an appointment as soon as possible for a visit            DISCHARGE MEDICATIONS:     Medication List        ASK your doctor about these medications      albuterol sulfate  (90 Base) MCG/ACT inhaler  Commonly known as: PROVENTIL;VENTOLIN;PROAIR     atorvastatin 20 MG tablet  Commonly known as: LIPITOR     cyclobenzaprine 10 MG tablet  Commonly known as: FLEXERIL     metoprolol succinate 100 MG extended release tablet  Commonly known as: TOPROL XL     valACYclovir 1 g tablet  Commonly known as: VALTREX  Take 2 pills twice daily x 1 day as needed for cold sore                DISCONTINUED MEDICATIONS:  Current Discharge Medication List          I am the Primary Clinician of Record.   Arin Shukla MD (electronically signed)      (Please note that parts of this dictation were completed with voice recognition software. Quite often

## 2024-08-28 LAB
BACTERIA SPEC CULT: NORMAL
CC UR VC: NORMAL
SERVICE CMNT-IMP: NORMAL

## 2024-12-16 ENCOUNTER — TELEPHONE (OUTPATIENT)
Age: 49
End: 2024-12-16

## 2024-12-16 DIAGNOSIS — B00.1 RECURRENT COLD SORES: ICD-10-CM

## 2024-12-16 NOTE — TELEPHONE ENCOUNTER
Patient requesting refill on     Requested Prescriptions     Pending Prescriptions Disp Refills    valACYclovir (VALTREX) 1 g tablet [Pharmacy Med Name: VALACYCLOVIR HCL 1 GRAM TABLET] 4 tablet 1     Sig: TAKE 2 PILLS TWICE DAILY X 1 DAY AS NEEDED FOR COLD SORE        Last OV 11/16/2023

## 2024-12-16 NOTE — TELEPHONE ENCOUNTER
PT needs refill on:    valACYclovir (VALTREX) 1 g tablet [[id number]] -PT has cold sores real bad    NOV 1/15/25    [id number] [place] [address] - P [phone] - F [phone] [[id number]]

## 2024-12-17 RX ORDER — VALACYCLOVIR HYDROCHLORIDE 1 G/1
TABLET, FILM COATED ORAL
Qty: 4 TABLET | Refills: 1 | Status: SHIPPED | OUTPATIENT
Start: 2024-12-17

## 2025-01-15 ENCOUNTER — OFFICE VISIT (OUTPATIENT)
Age: 50
End: 2025-01-15

## 2025-01-15 VITALS
HEART RATE: 72 BPM | SYSTOLIC BLOOD PRESSURE: 155 MMHG | HEIGHT: 67 IN | OXYGEN SATURATION: 97 % | RESPIRATION RATE: 18 BRPM | TEMPERATURE: 97.4 F | WEIGHT: 199.38 LBS | DIASTOLIC BLOOD PRESSURE: 100 MMHG | BODY MASS INDEX: 31.29 KG/M2

## 2025-01-15 DIAGNOSIS — I25.10 CORONARY ARTERY DISEASE INVOLVING NATIVE CORONARY ARTERY OF NATIVE HEART WITHOUT ANGINA PECTORIS: ICD-10-CM

## 2025-01-15 DIAGNOSIS — M54.50 CHRONIC BILATERAL LOW BACK PAIN WITHOUT SCIATICA: ICD-10-CM

## 2025-01-15 DIAGNOSIS — K21.9 GASTROESOPHAGEAL REFLUX DISEASE WITHOUT ESOPHAGITIS: ICD-10-CM

## 2025-01-15 DIAGNOSIS — G89.29 CHRONIC BILATERAL LOW BACK PAIN WITHOUT SCIATICA: ICD-10-CM

## 2025-01-15 DIAGNOSIS — I10 ESSENTIAL (PRIMARY) HYPERTENSION: Primary | ICD-10-CM

## 2025-01-15 DIAGNOSIS — B00.1 RECURRENT COLD SORES: ICD-10-CM

## 2025-01-15 DIAGNOSIS — E78.00 PURE HYPERCHOLESTEROLEMIA, UNSPECIFIED: ICD-10-CM

## 2025-01-15 DIAGNOSIS — M67.40 GANGLION CYST: ICD-10-CM

## 2025-01-15 DIAGNOSIS — M54.12 CERVICAL RADICULOPATHY: ICD-10-CM

## 2025-01-15 PROBLEM — I25.2 HISTORY OF ST ELEVATION MYOCARDIAL INFARCTION (STEMI): Status: ACTIVE | Noted: 2025-01-15

## 2025-01-15 PROBLEM — Z95.5 HISTORY OF HEART ARTERY STENT: Status: ACTIVE | Noted: 2025-01-15

## 2025-01-15 RX ORDER — VALACYCLOVIR HYDROCHLORIDE 1 G/1
TABLET, FILM COATED ORAL
Qty: 10 TABLET | Refills: 2 | Status: SHIPPED | OUTPATIENT
Start: 2025-01-15

## 2025-01-15 RX ORDER — CYCLOBENZAPRINE HCL 10 MG
10 TABLET ORAL 3 TIMES DAILY PRN
Qty: 60 TABLET | Refills: 2 | Status: SHIPPED | OUTPATIENT
Start: 2025-01-15

## 2025-01-15 SDOH — ECONOMIC STABILITY: FOOD INSECURITY: WITHIN THE PAST 12 MONTHS, THE FOOD YOU BOUGHT JUST DIDN'T LAST AND YOU DIDN'T HAVE MONEY TO GET MORE.: SOMETIMES TRUE

## 2025-01-15 SDOH — ECONOMIC STABILITY: FOOD INSECURITY: WITHIN THE PAST 12 MONTHS, YOU WORRIED THAT YOUR FOOD WOULD RUN OUT BEFORE YOU GOT MONEY TO BUY MORE.: SOMETIMES TRUE

## 2025-01-15 ASSESSMENT — PATIENT HEALTH QUESTIONNAIRE - PHQ9
SUM OF ALL RESPONSES TO PHQ QUESTIONS 1-9: 0
1. LITTLE INTEREST OR PLEASURE IN DOING THINGS: NOT AT ALL
SUM OF ALL RESPONSES TO PHQ QUESTIONS 1-9: 0
2. FEELING DOWN, DEPRESSED OR HOPELESS: NOT AT ALL
SUM OF ALL RESPONSES TO PHQ QUESTIONS 1-9: 0
SUM OF ALL RESPONSES TO PHQ9 QUESTIONS 1 & 2: 0
SUM OF ALL RESPONSES TO PHQ QUESTIONS 1-9: 0

## 2025-01-15 NOTE — PROGRESS NOTES
\"Have you been to the ER, urgent care clinic since your last visit?  Hospitalized since your last visit?\"    YES - When: approximately 2 months ago.  Where and Why: urgent care.    “Have you seen or consulted any other health care providers outside our system since your last visit?”    NO    Have you had a mammogram?”   NO    No breast cancer screening on file      “Have you had a pap smear?”    NO    No cervical cancer screening on file       “Have you had a colorectal cancer screening such as a colonoscopy/FIT/Cologuard?    YES - Type: Colonoscopy - Where: [facility] April next 5 years Nurse/CMA to request most recent records if not in the chart     No colonoscopy on file  No cologuard on file  No FIT/FOBT on file   No flexible sigmoidoscopy on file

## 2025-01-15 NOTE — PROGRESS NOTES
Subjective:     CC: HTN, back pain, neck pain, GERD    Elizabeth Dubon is a 49 y.o. female who presents today for annual follow up for HTN. She also has several other complaints including back pain, neck pain, and GERD.    She moved here with her 2 children from [place] 3 years ago. She is a realtor.     She established care here last year.     New issues:  Went to urgent care at [facility] in November for neck and right arm pain. Cervical spinal xray showed:  1. No acute osseous abnormality identified.   2. Cervical spondylosis as described above.   3. Mild reversal of the normal cervical lordosis.     Was told she she had a pinched nerve. She has appt with neuro in Feb. Would like a prescription for flexeril because this helps relieve her pain in the meantime.     GERD  Reports more symptoms lately.   Would like a script for omeprazole.    Chronic low back pain   Would like a script for diclofenac cream- this has helped in the past.    Ganglion cyst, right wrist  Has had this a long time. It does not hurt. Does not interfere with ROM. Suggested compression therapy.     HTN  BP at goal today. She says she is taking Metoprolol (unsure of what dose), Lisinopril, and Valsartan. I informed her that usually patients do not take both of these medications together. Says she will go home and check her bottles and call us back. Denies CP, SOB, dizziness, or swelling.     Says she saw her cardiology specialist (NP Elizabeth Upton) in September 2024 who ordered labwork. She was also prescribed Wegovy to help her lose weight. (BMI 31). [facility] did not have it available. She would like me to send an alternative medication. I informed her that her insurance does not cover any of the GLP 1's and recommended [facility] here in [place] who offer those drugs at a discounted price out of pocket.     I was not aware that she was seeing a cardiologist until today. After reviewing records in Care Everywhere, there is a

## 2025-04-28 RX ORDER — CYCLOBENZAPRINE HCL 10 MG
10 TABLET ORAL 3 TIMES DAILY PRN
Qty: 60 TABLET | Refills: 2 | Status: SHIPPED | OUTPATIENT
Start: 2025-04-28

## 2025-04-28 NOTE — TELEPHONE ENCOUNTER
Patient requesting refill on     Requested Prescriptions     Pending Prescriptions Disp Refills    cyclobenzaprine (FLEXERIL) 10 MG tablet [Pharmacy Med Name: CYCLOBENZAPRINE 10 MG TABLET] 60 tablet 2     Sig: TAKE 1 TABLET BY MOUTH 3 TIMES DAILY AS NEEDED (NECK/ PAIN ARM)        Last OV 1/15/2025